# Patient Record
Sex: FEMALE | Race: WHITE | NOT HISPANIC OR LATINO | Employment: FULL TIME | URBAN - NONMETROPOLITAN AREA
[De-identification: names, ages, dates, MRNs, and addresses within clinical notes are randomized per-mention and may not be internally consistent; named-entity substitution may affect disease eponyms.]

---

## 2023-07-17 DIAGNOSIS — E89.0 HYPOTHYROIDISM, POSTSURGICAL: ICD-10-CM

## 2023-07-17 DIAGNOSIS — F32.A ANXIETY AND DEPRESSION: ICD-10-CM

## 2023-07-17 DIAGNOSIS — J45.909 MILD ASTHMA, UNSPECIFIED WHETHER COMPLICATED, UNSPECIFIED WHETHER PERSISTENT (HHS-HCC): ICD-10-CM

## 2023-07-17 DIAGNOSIS — F41.9 ANXIETY AND DEPRESSION: ICD-10-CM

## 2023-07-17 PROBLEM — E78.5 HYPERLIPIDEMIA: Status: ACTIVE | Noted: 2023-07-17

## 2023-07-17 RX ORDER — MONTELUKAST SODIUM 10 MG/1
10 TABLET ORAL DAILY
COMMUNITY
End: 2023-07-17 | Stop reason: SDUPTHER

## 2023-07-17 RX ORDER — FLUOXETINE HYDROCHLORIDE 20 MG/1
40 CAPSULE ORAL DAILY
COMMUNITY
End: 2023-07-17 | Stop reason: SDUPTHER

## 2023-07-17 RX ORDER — ALBUTEROL SULFATE 90 UG/1
2 AEROSOL, METERED RESPIRATORY (INHALATION)
COMMUNITY
Start: 2013-01-04 | End: 2023-09-19 | Stop reason: WASHOUT

## 2023-07-17 RX ORDER — FLUOXETINE HYDROCHLORIDE 20 MG/1
40 CAPSULE ORAL DAILY
Qty: 180 CAPSULE | Refills: 0 | Status: SHIPPED | OUTPATIENT
Start: 2023-07-17 | End: 2024-04-04 | Stop reason: SDUPTHER

## 2023-07-17 RX ORDER — LEVOTHYROXINE SODIUM 125 UG/1
125 TABLET ORAL DAILY
COMMUNITY
End: 2023-07-17 | Stop reason: SDUPTHER

## 2023-07-17 RX ORDER — MONTELUKAST SODIUM 10 MG/1
10 TABLET ORAL DAILY
Qty: 90 TABLET | Refills: 0 | Status: SHIPPED | OUTPATIENT
Start: 2023-07-17 | End: 2023-12-06 | Stop reason: SDUPTHER

## 2023-07-17 RX ORDER — CALCITRIOL 0.25 UG/1
0.25 CAPSULE ORAL
COMMUNITY
End: 2024-02-19 | Stop reason: SDUPTHER

## 2023-07-17 RX ORDER — LEVOTHYROXINE SODIUM 125 UG/1
125 TABLET ORAL DAILY
Qty: 90 TABLET | Refills: 0 | Status: SHIPPED | OUTPATIENT
Start: 2023-07-17 | End: 2024-01-26 | Stop reason: SDUPTHER

## 2023-07-17 RX ORDER — ALBUTEROL SULFATE 90 UG/1
AEROSOL, METERED RESPIRATORY (INHALATION)
Qty: 25.5 G | Refills: 3 | Status: SHIPPED | OUTPATIENT
Start: 2023-07-17 | End: 2023-09-19 | Stop reason: SDUPTHER

## 2023-09-19 ENCOUNTER — APPOINTMENT (OUTPATIENT)
Dept: PRIMARY CARE | Facility: CLINIC | Age: 58
End: 2023-09-19
Payer: COMMERCIAL

## 2023-09-19 ENCOUNTER — OFFICE VISIT (OUTPATIENT)
Dept: PRIMARY CARE | Facility: CLINIC | Age: 58
End: 2023-09-19
Payer: COMMERCIAL

## 2023-09-19 VITALS
DIASTOLIC BLOOD PRESSURE: 78 MMHG | BODY MASS INDEX: 38.05 KG/M2 | TEMPERATURE: 97.8 F | WEIGHT: 223.4 LBS | OXYGEN SATURATION: 98 % | SYSTOLIC BLOOD PRESSURE: 138 MMHG | HEART RATE: 84 BPM

## 2023-09-19 DIAGNOSIS — R05.9 COUGH, UNSPECIFIED TYPE: Primary | ICD-10-CM

## 2023-09-19 DIAGNOSIS — J45.909 MILD ASTHMA, UNSPECIFIED WHETHER COMPLICATED, UNSPECIFIED WHETHER PERSISTENT (HHS-HCC): ICD-10-CM

## 2023-09-19 DIAGNOSIS — J01.80 OTHER ACUTE SINUSITIS, RECURRENCE NOT SPECIFIED: ICD-10-CM

## 2023-09-19 PROBLEM — I25.10 MILD CAD: Status: ACTIVE | Noted: 2023-09-19

## 2023-09-19 PROBLEM — K44.9 HIATAL HERNIA: Status: ACTIVE | Noted: 2023-09-19

## 2023-09-19 PROBLEM — R91.1 LUNG NODULE: Status: ACTIVE | Noted: 2023-09-19

## 2023-09-19 PROBLEM — K76.0 FATTY LIVER: Status: ACTIVE | Noted: 2023-09-19

## 2023-09-19 PROBLEM — L73.2 HIDRADENITIS SUPPURATIVA: Status: ACTIVE | Noted: 2021-07-06

## 2023-09-19 PROBLEM — R73.09 ELEVATED GLUCOSE: Status: ACTIVE | Noted: 2023-09-19

## 2023-09-19 PROBLEM — L91.8 OTHER HYPERTROPHIC DISORDERS OF THE SKIN: Status: ACTIVE | Noted: 2021-07-06

## 2023-09-19 PROBLEM — M67.919 ROTATOR CUFF DISORDER: Status: ACTIVE | Noted: 2023-09-19

## 2023-09-19 PROBLEM — E04.0 SIMPLE GOITER: Status: ACTIVE | Noted: 2023-09-19

## 2023-09-19 PROBLEM — Z85.850 HISTORY OF THYROID CANCER: Status: ACTIVE | Noted: 2023-09-19

## 2023-09-19 PROBLEM — G47.30 SLEEP APNEA: Status: ACTIVE | Noted: 2023-09-19

## 2023-09-19 PROBLEM — I70.0 AORTIC ATHEROSCLEROSIS (CMS-HCC): Status: ACTIVE | Noted: 2023-09-19

## 2023-09-19 PROBLEM — E66.9 OBESITY (BMI 30-39.9): Status: ACTIVE | Noted: 2023-09-19

## 2023-09-19 PROBLEM — E89.2 HYPOPARATHYROIDISM AFTER PROCEDURE (MULTI): Status: ACTIVE | Noted: 2023-09-19

## 2023-09-19 PROBLEM — E83.51 HYPOCALCEMIA: Status: ACTIVE | Noted: 2023-09-19

## 2023-09-19 PROBLEM — E89.2: Status: ACTIVE | Noted: 2023-09-19

## 2023-09-19 PROCEDURE — 99214 OFFICE O/P EST MOD 30 MIN: CPT | Performed by: FAMILY MEDICINE

## 2023-09-19 PROCEDURE — 1036F TOBACCO NON-USER: CPT | Performed by: FAMILY MEDICINE

## 2023-09-19 RX ORDER — ALBUTEROL SULFATE 90 UG/1
AEROSOL, METERED RESPIRATORY (INHALATION)
Qty: 25.5 G | Refills: 3 | Status: SHIPPED | OUTPATIENT
Start: 2023-09-19 | End: 2023-12-06 | Stop reason: SDUPTHER

## 2023-09-19 RX ORDER — AMOXICILLIN 500 MG/1
1000 CAPSULE ORAL 2 TIMES DAILY
Qty: 40 CAPSULE | Refills: 0 | Status: CANCELLED | OUTPATIENT
Start: 2023-09-19 | End: 2023-09-29

## 2023-09-19 RX ORDER — MOMETASONE FUROATE AND FORMOTEROL FUMARATE DIHYDRATE 200; 5 UG/1; UG/1
2 AEROSOL RESPIRATORY (INHALATION) 2 TIMES DAILY
COMMUNITY
Start: 2018-03-07 | End: 2023-09-19 | Stop reason: SDUPTHER

## 2023-09-19 RX ORDER — AZITHROMYCIN 250 MG/1
TABLET, FILM COATED ORAL
Qty: 6 TABLET | Refills: 0 | Status: SHIPPED | OUTPATIENT
Start: 2023-09-19 | End: 2023-10-11 | Stop reason: WASHOUT

## 2023-09-19 RX ORDER — MOMETASONE FUROATE AND FORMOTEROL FUMARATE DIHYDRATE 200; 5 UG/1; UG/1
2 AEROSOL RESPIRATORY (INHALATION) 2 TIMES DAILY
Qty: 30 G | Refills: 3 | Status: SHIPPED | OUTPATIENT
Start: 2023-09-19 | End: 2024-04-23

## 2023-09-19 NOTE — PROGRESS NOTES
Subjective      HPI:    Arlette Cedeño. Is 58 y.o.. female. Here for acute visit-     PCP is - Dr Holland pt         Chief Complaint   Patient presents with    URI     Worsening sx         What concern/ problem/pain/symptom  brings you here today?  Cold sx are worsening      how long has pt had sxs?  X1 week      describe symptoms-    fever- no  nausea- no  vomiting- no  SOB- no  CP- no      how often do symptoms occur? Off and on      has pt tried anything for current symptoms, including medications (OTC or prescription)  ?  no      what makes symptoms worse?nothing  specific      has pt been seen recently for this problem ( within past 2-3 weeks) ? no    if yes- where?    by who?    what treatment was provided?      Social History     Tobacco Use   Smoking Status Never   Smokeless Tobacco Never        reports current alcohol use.       Objective            Vitals:    09/19/23 1459   BP: 138/78   BP Location: Left arm   Patient Position: Sitting   BP Cuff Size: Adult long   Pulse: 84   Temp: 36.6 °C (97.8 °F)   TempSrc: Temporal   SpO2: 98%   Weight: 101 kg (223 lb 6.4 oz)           PHYSICAL:      Pt is A and O x3, NAD, Vital signs are within normal limits  General Appearance- normal , good hygiene,talks easily  EYES- conjunctiva and lids- normal  EARS/NOSE-TM's normal, head normocephalic and atraumatic, nasopharynx-green nasal discharge, no trismus, no hot potato voice  OROPHARYNX- no exudate  NECK- supple, FROM  LYMPH- no cervical lymph nodes palpated   CV- RRR without murmur  PULM- CTA bilaterally  Respiratory effort- normal respiratory effort , no retractions or nasal flaring   ABDOMEN- normoactive BS's   EXTREMITIES-no edema,NT  SKIN- no abnormal skin lesions on inspection or palpation   NEURO- no focal deficits  PSYCH- pleasant, normal judgement and insight      BP Readings from Last 3 Encounters:   09/19/23 138/78   01/31/23 131/69   12/07/22 129/80         Wt Readings from Last 3 Encounters:    09/19/23 101 kg (223 lb 6.4 oz)   01/31/23 90.8 kg (200 lb 4 oz)   12/07/22 86 kg (189 lb 9.6 oz)       BMI Readings from Last 3 Encounters:   09/19/23 38.05 kg/m²   01/31/23 34.11 kg/m²   12/07/22 32.29 kg/m²           The number and complexity of problems addressed is considered moderate.  The amount and/or complexity of data reviewed and analyzed is considered moderate. The risk of complications and/or morbidity/mortality of patient is considered moderate. Overall, this patient encounter is considered a moderate risk visit.      What are antibiotics?  Antibiotics are medicines that help people fight infections caused by bacteria. They work by killing bacteria that are in the body. These medicines come in many different forms, including pills, ointments, and liquids that are given by injection.    Antibiotics can do a lot of good. For people with serious bacterial infections, antibiotics can save lives. But people use them far too often, even when they're not needed. This is causing a very serious problem called antibiotic resistance. Antibiotic resistance happens when bacteria that have been exposed to an antibiotic change so that the antibiotic can no longer kill them. In those bacteria, the antibiotic has no effect. Because of this problem, doctors are having a harder and harder time treating infections. Experts worry that there will soon be infections that don't respond to any antibiotics.    When are antibiotics helpful?  Antibiotics can help people fight off infections caused by bacteria. They do not work on infections caused by viruses.    Some common bacterial infections that are treated with antibiotics include:    Strep throat    Pneumonia (an infection of the lungs)    Bladder infections    Infections you catch through sex, such as gonorrhea and chlamydia    When are antibiotics NOT helpful?    Antibiotics do not work on infections caused by viruses.    Antibiotics are not helpful for the common cold,  because the common cold is caused by a virus.    Antibiotics are not helpful for the flu, because the flu is caused by a virus. (People with the flu can be treated with medicines called antiviral medicines, which are different from antibiotics.)      Even though antibiotics don't work on infections caused by viruses, people sometimes believe that they do. That's because they took antibiotics for a viral infection before and then got better. The problem is that those people would have gotten better with or without an antibiotic. When they get better with the antibiotic, they think that's what cured them, when in reality the antibiotic had nothing to do with it.    What's the harm in taking antibiotics even if they might not help?  There are many reasons you should not take antibiotics unless you absolutely need them:    Antibiotics cause side effects, such as nausea, vomiting, and diarrhea. They can even make it more likely that you will get a different kind of infection, such as yeast infection (if you are a woman).    Allergies to antibiotics are common. You can develop an allergy to an antibiotic, even if you have not had a problem with it before. Some allergies are just unpleasant, causing rashes and itching. But some can be very serious and even life-threatening. It is better to avoid any risk of an allergy, if the antibiotic wouldn't help you anyway.    Overuse of antibiotics leads to antibiotic resistance. Using antibiotics when they are not needed gives bacteria a chance to change, so that the antibiotics cannot hurt them later on. People who have infections caused by antibiotic-resistant bacteria often have to be treated in the hospital with many different antibiotics. People can even die from these infections, because there is no antibiotic that will cure them.    When should I take antibiotics?  You should take antibiotics only when a doctor or nurse prescribes them to you. You should never take  "antibiotics prescribed to someone else, and you should not take antibiotics that were prescribed to you for a previous illness. When prescribing an antibiotic, doctors and nurses have to carefully pick the right antibiotic for a particular infection. Not all antibiotics work on all bacteria.    If you do have an infection caused by a bacteria, your doctor or nurse might want to find out what that bacteria is, and which antibiotics can kill it. They do this by taking a \"culture\" of the bacteria and growing it in the lab. But it's not possible to do a culture on someone who has already started an antibiotic. So if you start an antibiotic without input from a doctor or nurse it will be impossible to know if you have taken the right one.    What can I do to reduce antibiotic resistance?  Here are some things that can help:    Do not pressure your doctor or nurse for antibiotics when they do not think you need them.    If you are prescribed antibiotics, finish all of the medicine and take it exactly as directed. Never skip doses or stop taking the medicine without talking to your doctor or nurse.    Do not give antibiotics that were prescribed to you to anyone else.    What if my doctor prescribes an antibiotic that did not work for me before?  If an antibiotic did not work for you before, that does not mean it will never work for you. If you have used an antibiotic before and it did not work, tell your doctor. But keep in mind that the infection you had before might not have been caused by the same bacteria that you have now. The \"best\" antibiotic is the right one for the bacteria causing the infection, not for the person with the infection.    What if I am allergic to an antibiotic?  If you had a bad reaction to an antibiotic, tell your doctor or nurse about it. But do not assume you are allergic unless your doctor or nurse tells you that you are.    Many people who think they are allergic to an antibiotic are wrong. " If you get nauseous after taking an antibiotic, that does not mean you are allergic to it. Nausea is a common side effect of many antibiotics. If you are a woman and you get a yeast infection after taking an antibiotic, that does not mean you are allergic to it. Yeast infections are a common side effect of antibiotics.    Symptoms of antibiotic allergy can be mild and include a flat rash and itching. They can also be more serious and include:    Hives - Hives are raised, red patches of skin that are usually very itchy (picture 1).    Lip or tongue swelling    Trouble swallowing or breathing    Serious allergy symptoms can start right after you start taking an antibiotic if you are very sensitive. Less serious symptoms, on the other hand, often start a day or more later.    Almost all antibiotics may cause possible side effects of allergic reaction, nausea, vomiting, diarrhea- most worrisome caused by C. diff, and secondary yeast infection.        Assessment/Plan        1. Cough, unspecified type  azithromycin (Zithromax Z-John) 250 mg tablet      2. Other acute sinusitis, recurrence not specified  azithromycin (Zithromax Z-John) 250 mg tablet            No orders of the defined types were placed in this encounter.                Call if no better or if symptoms worsen                                                                                                                                            Subjective      HPI:    Arlette Cedeño. Is 58 y.o.. female. Here for acute visit-     PCP is - Dr Holland pt         Chief Complaint   Patient presents with    URI     Worsening sx         What concern/ problem/pain/symptom  brings you here today?      how long has pt had sxs?      describe symptoms-    fever-  nausea-  vomiting-  SOB-  CP-      how often do symptoms occur?      has pt tried anything for current symptoms, including medications (OTC or prescription)  ?        what makes symptoms worse?      has  pt been seen recently for this problem ( within past 2-3 weeks) ?     if yes- where?    by who?    what treatment was provided?      Social History     Tobacco Use   Smoking Status Never   Smokeless Tobacco Never        reports current alcohol use.       Objective            Vitals:    09/19/23 1459   BP: 138/78   BP Location: Left arm   Patient Position: Sitting   BP Cuff Size: Adult long   Pulse: 84   Temp: 36.6 °C (97.8 °F)   TempSrc: Temporal   SpO2: 98%   Weight: 101 kg (223 lb 6.4 oz)           PHYSICAL:      CONSTITUTIONAL- NAD, Pt is A and O x3, Vital signs are within normal limits, well- hydrated, non-toxic   General Appearance- normal , good hygiene, talks easily  EYES- conjunctiva and lids- normal  EARS/NOSE-TM's normal, head normocephalic and atraumatic, nasopharynx-no nasal discharge, no trismus, no hot potato voice, oropharynx- normal  NECK- supple, FROM  LYMPH- no cervical lymph nodes palpated   CV- RRR without murmur  PULM- mild rhonchi, no wheezes bilaterally       Respiratory effort- normal respiratory effort , no retractions or nasal flaring   ABDOMEN- normoactive BS's , soft , NT, no hepatosplenomegaly palpated  EXTREMITIES- no edema or varicosities           SKIN- no abnormal skin lesions on inspection or palpation   NEURO- no focal deficits  PSYCH- pleasant, normal judgement and insight              BP Readings from Last 3 Encounters:   09/19/23 138/78   01/31/23 131/69   12/07/22 129/80         Wt Readings from Last 3 Encounters:   09/19/23 101 kg (223 lb 6.4 oz)   01/31/23 90.8 kg (200 lb 4 oz)   12/07/22 86 kg (189 lb 9.6 oz)       BMI Readings from Last 3 Encounters:   09/19/23 38.05 kg/m²   01/31/23 34.11 kg/m²   12/07/22 32.29 kg/m²           The number and complexity of problems addressed is considered moderate.  The amount and/or complexity of data reviewed and analyzed is considered moderate. The risk of complications and/or morbidity/mortality of patient is considered moderate.  Overall, this patient encounter is considered a moderate risk visit.      What are antibiotics?  Antibiotics are medicines that help people fight infections caused by bacteria. They work by killing bacteria that are in the body. These medicines come in many different forms, including pills, ointments, and liquids that are given by injection.    Antibiotics can do a lot of good. For people with serious bacterial infections, antibiotics can save lives. But people use them far too often, even when they're not needed. This is causing a very serious problem called antibiotic resistance. Antibiotic resistance happens when bacteria that have been exposed to an antibiotic change so that the antibiotic can no longer kill them. In those bacteria, the antibiotic has no effect. Because of this problem, doctors are having a harder and harder time treating infections. Experts worry that there will soon be infections that don't respond to any antibiotics.    When are antibiotics helpful?  Antibiotics can help people fight off infections caused by bacteria. They do not work on infections caused by viruses.    Some common bacterial infections that are treated with antibiotics include:    Strep throat    Pneumonia (an infection of the lungs)    Bladder infections    Infections you catch through sex, such as gonorrhea and chlamydia    When are antibiotics NOT helpful?    Antibiotics do not work on infections caused by viruses.    Antibiotics are not helpful for the common cold, because the common cold is caused by a virus.    Antibiotics are not helpful for the flu, because the flu is caused by a virus. (People with the flu can be treated with medicines called antiviral medicines, which are different from antibiotics.)      Even though antibiotics don't work on infections caused by viruses, people sometimes believe that they do. That's because they took antibiotics for a viral infection before and then got better. The problem is that  those people would have gotten better with or without an antibiotic. When they get better with the antibiotic, they think that's what cured them, when in reality the antibiotic had nothing to do with it.    What's the harm in taking antibiotics even if they might not help?  There are many reasons you should not take antibiotics unless you absolutely need them:    Antibiotics cause side effects, such as nausea, vomiting, and diarrhea. They can even make it more likely that you will get a different kind of infection, such as yeast infection (if you are a woman).    Allergies to antibiotics are common. You can develop an allergy to an antibiotic, even if you have not had a problem with it before. Some allergies are just unpleasant, causing rashes and itching. But some can be very serious and even life-threatening. It is better to avoid any risk of an allergy, if the antibiotic wouldn't help you anyway.    Overuse of antibiotics leads to antibiotic resistance. Using antibiotics when they are not needed gives bacteria a chance to change, so that the antibiotics cannot hurt them later on. People who have infections caused by antibiotic-resistant bacteria often have to be treated in the hospital with many different antibiotics. People can even die from these infections, because there is no antibiotic that will cure them.    When should I take antibiotics?  You should take antibiotics only when a doctor or nurse prescribes them to you. You should never take antibiotics prescribed to someone else, and you should not take antibiotics that were prescribed to you for a previous illness. When prescribing an antibiotic, doctors and nurses have to carefully pick the right antibiotic for a particular infection. Not all antibiotics work on all bacteria.    If you do have an infection caused by a bacteria, your doctor or nurse might want to find out what that bacteria is, and which antibiotics can kill it. They do this by taking a  "\"culture\" of the bacteria and growing it in the lab. But it's not possible to do a culture on someone who has already started an antibiotic. So if you start an antibiotic without input from a doctor or nurse it will be impossible to know if you have taken the right one.    What can I do to reduce antibiotic resistance?  Here are some things that can help:    Do not pressure your doctor or nurse for antibiotics when they do not think you need them.    If you are prescribed antibiotics, finish all of the medicine and take it exactly as directed. Never skip doses or stop taking the medicine without talking to your doctor or nurse.    Do not give antibiotics that were prescribed to you to anyone else.    What if my doctor prescribes an antibiotic that did not work for me before?  If an antibiotic did not work for you before, that does not mean it will never work for you. If you have used an antibiotic before and it did not work, tell your doctor. But keep in mind that the infection you had before might not have been caused by the same bacteria that you have now. The \"best\" antibiotic is the right one for the bacteria causing the infection, not for the person with the infection.    What if I am allergic to an antibiotic?  If you had a bad reaction to an antibiotic, tell your doctor or nurse about it. But do not assume you are allergic unless your doctor or nurse tells you that you are.    Many people who think they are allergic to an antibiotic are wrong. If you get nauseous after taking an antibiotic, that does not mean you are allergic to it. Nausea is a common side effect of many antibiotics. If you are a woman and you get a yeast infection after taking an antibiotic, that does not mean you are allergic to it. Yeast infections are a common side effect of antibiotics.    Symptoms of antibiotic allergy can be mild and include a flat rash and itching. They can also be more serious and include:    Hives - Hives are " raised, red patches of skin that are usually very itchy (picture 1).    Lip or tongue swelling    Trouble swallowing or breathing    Serious allergy symptoms can start right after you start taking an antibiotic if you are very sensitive. Less serious symptoms, on the other hand, often start a day or more later.    Almost all antibiotics may cause possible side effects of allergic reaction, nausea, vomiting, diarrhea- most worrisome caused by C. diff, and secondary yeast infection.        Assessment/Plan        1. Cough, unspecified type  azithromycin (Zithromax Z-John) 250 mg tablet      2. Other acute sinusitis, recurrence not specified  azithromycin (Zithromax Z-John) 250 mg tablet                  Start zithromax        Call if no better or if symptoms worsen

## 2023-10-10 RX ORDER — BENZONATATE 100 MG/1
100 CAPSULE ORAL 3 TIMES DAILY PRN
COMMUNITY
End: 2023-10-11 | Stop reason: WASHOUT

## 2023-10-10 RX ORDER — TRIAMCINOLONE ACETONIDE 1 MG/G
1 CREAM TOPICAL 2 TIMES DAILY
COMMUNITY
End: 2024-04-23 | Stop reason: SDUPTHER

## 2023-10-10 RX ORDER — ERGOCALCIFEROL 1.25 MG/1
1.25 CAPSULE ORAL
COMMUNITY
End: 2023-12-06 | Stop reason: SDUPTHER

## 2023-10-10 RX ORDER — CLINDAMYCIN PHOSPHATE 10 UG/ML
LOTION TOPICAL
COMMUNITY
Start: 2021-07-06 | End: 2024-04-23 | Stop reason: SDUPTHER

## 2023-10-10 RX ORDER — CALCIUM ACETATE 667 MG/1
2001 TABLET ORAL DAILY
COMMUNITY
End: 2024-04-23 | Stop reason: WASHOUT

## 2023-10-10 RX ORDER — BENZOYL PEROXIDE 50 MG/ML
1 LIQUID TOPICAL
COMMUNITY
Start: 2021-07-06

## 2023-10-11 ENCOUNTER — OFFICE VISIT (OUTPATIENT)
Dept: PRIMARY CARE | Facility: CLINIC | Age: 58
End: 2023-10-11
Payer: COMMERCIAL

## 2023-10-11 VITALS
OXYGEN SATURATION: 97 % | RESPIRATION RATE: 14 BRPM | SYSTOLIC BLOOD PRESSURE: 118 MMHG | HEIGHT: 64 IN | HEART RATE: 81 BPM | WEIGHT: 226 LBS | DIASTOLIC BLOOD PRESSURE: 68 MMHG | TEMPERATURE: 98.7 F | BODY MASS INDEX: 38.58 KG/M2

## 2023-10-11 DIAGNOSIS — E89.0 HYPOTHYROIDISM, POSTSURGICAL: ICD-10-CM

## 2023-10-11 DIAGNOSIS — F41.9 ANXIETY AND DEPRESSION: ICD-10-CM

## 2023-10-11 DIAGNOSIS — Z12.11 COLON CANCER SCREENING: ICD-10-CM

## 2023-10-11 DIAGNOSIS — Z85.850 HISTORY OF THYROID CANCER: ICD-10-CM

## 2023-10-11 DIAGNOSIS — E66.9 CLASS 2 OBESITY WITH BODY MASS INDEX (BMI) OF 38.0 TO 38.9 IN ADULT, UNSPECIFIED OBESITY TYPE, UNSPECIFIED WHETHER SERIOUS COMORBIDITY PRESENT: ICD-10-CM

## 2023-10-11 DIAGNOSIS — F32.A ANXIETY AND DEPRESSION: ICD-10-CM

## 2023-10-11 DIAGNOSIS — Z00.00 HEALTHCARE MAINTENANCE: Primary | ICD-10-CM

## 2023-10-11 DIAGNOSIS — E89.2 HYPOPARATHYROIDISM AFTER PROCEDURE (MULTI): ICD-10-CM

## 2023-10-11 DIAGNOSIS — Z12.31 ENCOUNTER FOR SCREENING MAMMOGRAM FOR MALIGNANT NEOPLASM OF BREAST: ICD-10-CM

## 2023-10-11 DIAGNOSIS — I70.0 AORTIC ATHEROSCLEROSIS (CMS-HCC): ICD-10-CM

## 2023-10-11 DIAGNOSIS — E83.51 HYPOCALCEMIA: ICD-10-CM

## 2023-10-11 DIAGNOSIS — R73.09 ELEVATED GLUCOSE: ICD-10-CM

## 2023-10-11 DIAGNOSIS — R25.1 TREMOR: ICD-10-CM

## 2023-10-11 DIAGNOSIS — E89.2 S/P TOTAL PARATHYROIDECTOMY (CMS/HCC): ICD-10-CM

## 2023-10-11 DIAGNOSIS — R91.1 LUNG NODULE: ICD-10-CM

## 2023-10-11 DIAGNOSIS — I25.10 MILD CAD: ICD-10-CM

## 2023-10-11 DIAGNOSIS — Z12.4 SCREENING FOR CERVICAL CANCER: ICD-10-CM

## 2023-10-11 DIAGNOSIS — E78.5 HYPERLIPIDEMIA, UNSPECIFIED HYPERLIPIDEMIA TYPE: ICD-10-CM

## 2023-10-11 PROBLEM — E04.0 SIMPLE GOITER: Status: RESOLVED | Noted: 2023-09-19 | Resolved: 2023-10-11

## 2023-10-11 PROCEDURE — 1036F TOBACCO NON-USER: CPT | Performed by: FAMILY MEDICINE

## 2023-10-11 PROCEDURE — 99396 PREV VISIT EST AGE 40-64: CPT | Performed by: FAMILY MEDICINE

## 2023-10-11 PROCEDURE — 3008F BODY MASS INDEX DOCD: CPT | Performed by: FAMILY MEDICINE

## 2023-10-11 ASSESSMENT — ANXIETY QUESTIONNAIRES
IF YOU CHECKED OFF ANY PROBLEMS ON THIS QUESTIONNAIRE, HOW DIFFICULT HAVE THESE PROBLEMS MADE IT FOR YOU TO DO YOUR WORK, TAKE CARE OF THINGS AT HOME, OR GET ALONG WITH OTHER PEOPLE: SOMEWHAT DIFFICULT
GAD7 TOTAL SCORE: 6
7. FEELING AFRAID AS IF SOMETHING AWFUL MIGHT HAPPEN: SEVERAL DAYS
3. WORRYING TOO MUCH ABOUT DIFFERENT THINGS: MORE THAN HALF THE DAYS
6. BECOMING EASILY ANNOYED OR IRRITABLE: SEVERAL DAYS
1. FEELING NERVOUS, ANXIOUS, OR ON EDGE: NOT AT ALL
5. BEING SO RESTLESS THAT IT IS HARD TO SIT STILL: NOT AT ALL
2. NOT BEING ABLE TO STOP OR CONTROL WORRYING: MORE THAN HALF THE DAYS
4. TROUBLE RELAXING: NOT AT ALL

## 2023-10-11 ASSESSMENT — PATIENT HEALTH QUESTIONNAIRE - PHQ9
2. FEELING DOWN, DEPRESSED OR HOPELESS: MORE THAN HALF THE DAYS
9. THOUGHTS THAT YOU WOULD BE BETTER OFF DEAD, OR OF HURTING YOURSELF: MORE THAN HALF THE DAYS
6. FEELING BAD ABOUT YOURSELF - OR THAT YOU ARE A FAILURE OR HAVE LET YOURSELF OR YOUR FAMILY DOWN: NEARLY EVERY DAY
4. FEELING TIRED OR HAVING LITTLE ENERGY: MORE THAN HALF THE DAYS
1. LITTLE INTEREST OR PLEASURE IN DOING THINGS: MORE THAN HALF THE DAYS
8. MOVING OR SPEAKING SO SLOWLY THAT OTHER PEOPLE COULD HAVE NOTICED. OR THE OPPOSITE, BEING SO FIGETY OR RESTLESS THAT YOU HAVE BEEN MOVING AROUND A LOT MORE THAN USUAL: SEVERAL DAYS
SUM OF ALL RESPONSES TO PHQ QUESTIONS 1-9: 17
10. IF YOU CHECKED OFF ANY PROBLEMS, HOW DIFFICULT HAVE THESE PROBLEMS MADE IT FOR YOU TO DO YOUR WORK, TAKE CARE OF THINGS AT HOME, OR GET ALONG WITH OTHER PEOPLE: SOMEWHAT DIFFICULT
5. POOR APPETITE OR OVEREATING: MORE THAN HALF THE DAYS
SUM OF ALL RESPONSES TO PHQ9 QUESTIONS 1 AND 2: 4
3. TROUBLE FALLING OR STAYING ASLEEP OR SLEEPING TOO MUCH: MORE THAN HALF THE DAYS
7. TROUBLE CONCENTRATING ON THINGS, SUCH AS READING THE NEWSPAPER OR WATCHING TELEVISION: SEVERAL DAYS

## 2023-10-11 NOTE — ASSESSMENT & PLAN NOTE
s/p surgery to remove parathyroids and thyroid in 2013 2/2 thyroid carcinoma   Patient due to find new endocrinologist, referral placed today.  Routine labs ordered today.  Continue current medications as prescribed.

## 2023-10-11 NOTE — ASSESSMENT & PLAN NOTE
Mood suboptimally controlled on Prozac 20 mg daily, since tolerating well will increase to Prozac 40 mg daily.  She notes some concern for possible ADD, patient to make follow-up visit to further discuss this.

## 2023-10-11 NOTE — ASSESSMENT & PLAN NOTE
Post-surgical hypothyroidism due to thyroid carcinoma diagnosed in 2013  S/p surgery to remove parathyroids and thyroid.   Patient presently on Levothyroxine, suppressive dose.     Patient due to find new endocrinologist, referral placed today.  Routine labs ordered today.  Continue current medications as prescribed.

## 2023-10-11 NOTE — ASSESSMENT & PLAN NOTE
A1c low prediabetic range in past, lifestyle managed  Unable to tolerate Metformin secondary to GI upset  4/2021 Hgb A1c 5.8, repeat labs ordered today including A1c.  Diet and exercise recommendations revisited, see wellness A/P.

## 2023-10-11 NOTE — PROGRESS NOTES
Subjective   Patient ID: Arlette Cedeño is a 58 y.o. female who presents for Annual Exam.    HPI     Patient presents today for annual physical.  Patient is doing well overall, they have no new concerns or issues.     Patient states she has not found a new endocrinologist yet.  She states she has not scheduled for her colonoscopy either.    She notes tremors, worse in AM, bilaterally.  Very fine and mild, states more noticeable when she first wakes.  Mother passed from advanced parkinson's.    WELLNESS VISIT   TDAP: none found  SHINGRIX: none found  PNEUMOVAX: N/A  PAP: referral to GYN 4/2021  MAMMO: 4/2021 R axilla asymmetry(corresponds to HS on US)(sister w/ h/o breast CA)  CSCOPE: none found  DEXA: N/A  HEP C SCREEN: none found  CACS: 89 (low) in 8/2021 (aortic atherosclerosis + fatty liver + hiatal hernia)  LIPID: 4/2021 TC/HDL ratio 5.1 ()    Dental visits up to date.  Vision screening up to date.    Patient declines influenza vaccine.  Patient to inquire with insurance regarding Shingrix vaccine.  Patient declines TDAP vaccine.    Breast cancer screening: DUE  Reports family history in first degree relative - sister.  Denies lumps/bumps, skin changes, nipple retraction, or nipple drainage.    Cervical cancer screening: DUE  Denies family history in first degree relative.  Denies pelvic pain, vaginal discharge, or vaginal bleeding.    Colon cancer screening: DUE  Denies family history in first degree relative.  Reports possible dairy/lactose intolerance.  Denies melena, hematochezia, constipation, diarrhea, bloating, change in bowel habits.    ROUTINE VISIT  CHRONIC CONDITIONS:   -Mood  Taking Prozac 20 mg daily.    Notes some depression, only taking the 20 mg daily, was told she could increase to the 40 mg if needed but never did.     -HLD + mild CAD  Not currently on statin or ASA therapy.   Recommended statin therapy 8/2021 OV, patient deferred and opted to work on lifestyle measures.  8/2021 CACS  was 89.   4/2021 TC/HDL ratio 5.1 ()    Patient denies any facial droop, sudden vision loss, weakness or numbness on one side of body.   Patient denies chest pain, shortness of breath with exertion, palpitations, or symptoms of claudication.     -Hyperglycemia  A1c low prediabetic range, lifestyle managed  Unable to tolerate Metformin secondary to GI upset  4/2021 Hgb A1c 5.8.  11/2021 fasting glucose 103     -Hypothyroidism, post-surgical  Thyroid carcinoma diagnosed in 2013  S/p surgery to remove parathyroids and thyroid.   Patient presently on Levothyroxine, suppressive dose.   4/2021 TSH 0.06, T4 normal, T3 normal. Antithyroglobulin antibody negative.    -Hypoparathyroidism  s/p surgery to remove parathyroids and thyroid in 2013 2/2 thyroid carcinoma  Taking Calcitriol 0.25 mcg 4 tablets daily + Calcium Acetate 667 mg 2 tablets daily.     -Hypocalcemia, on Calcium Acetate 667 mg, 3 tablets daily.   11/2021 Calcium low at 8.3 (low normal 8.6. Ionized calcium low (1.05, normal 1.10).  4/2021 Calcium low at 8.1 (low normal 8.6). Ionized calcium low (1.00, normal 1.10).     -Vitamin D deficiency  Recently improved to sufficient range with prescription vitamin D (8/2021)  11/2021 Vit D level normal (39)  4/2021 Vit D level low (22).     -Lung nodule  3-4 mm lateral right, incidental finding on 8/2021 CACS.   Because this is less than 6 mm no follow-up necessary per Fleischner Criteria.   Patient non-smoker but would like to have repeat CT done in 6 months due to family history (sister with hx of breast cancer spread to lymph nodes) and personal history of thyroid carcinoma.     11/2021 CT Chest showed stable LLL nodule but new RLL nodule not in field of view on prior exam.   Radiology recommended considering 2 year follow-up from initial CT cardiac scoring exam to demonstrate stability.     Repeat due 2023.    Review of Systems   All other systems reviewed and are negative.      Objective   /68 (BP  "Location: Right arm, Patient Position: Sitting, BP Cuff Size: Adult)   Pulse 81   Temp 37.1 °C (98.7 °F)   Resp 14   Ht 1.626 m (5' 4\")   Wt 103 kg (226 lb)   SpO2 97%   BMI 38.79 kg/m²     Physical Exam  Vitals and nursing note reviewed.   Constitutional:       General: She is not in acute distress.     Appearance: Normal appearance. She is not toxic-appearing.   HENT:      Head: Normocephalic and atraumatic.   Eyes:      Extraocular Movements: Extraocular movements intact.      Pupils: Pupils are equal, round, and reactive to light.   Neck:      Thyroid: No thyromegaly.      Vascular: No hepatojugular reflux or JVD.   Cardiovascular:      Rate and Rhythm: Normal rate and regular rhythm.      Heart sounds: No murmur heard.     No friction rub. No gallop.   Pulmonary:      Effort: Pulmonary effort is normal.      Breath sounds: Normal breath sounds. No wheezing, rhonchi or rales.   Abdominal:      General: Bowel sounds are normal. There is no distension.      Palpations: Abdomen is soft. There is no mass.      Tenderness: There is no abdominal tenderness. There is no guarding.   Musculoskeletal:      Right lower leg: No edema.      Left lower leg: No edema.   Lymphadenopathy:      Cervical: No cervical adenopathy.   Neurological:      General: No focal deficit present.      Mental Status: She is alert and oriented to person, place, and time.      Comments: Fine tremor with intention, bilaterally  No dysdiadochokinesis   Psychiatric:         Mood and Affect: Mood normal.         Behavior: Behavior normal.         Assessment/Plan   Problem List Items Addressed This Visit             ICD-10-CM    Anxiety and depression F41.9, F32.A     Mood suboptimally controlled on Prozac 20 mg daily, since tolerating well will increase to Prozac 40 mg daily.  She notes some concern for possible ADD, patient to make follow-up visit to further discuss this.         Hyperlipidemia E78.5     8/2021 CACS was 89.   4/2021 TC/HDL " ratio 5.1 ()  Goal TC/HDL ratio 3.4 or less, LDL 99 or less,  or less, and TRIG 150 or less.     Previously recommended statin but deferred, continues with lifestyle management at this time.  Repeat lipid panel ordered today.  Diet and exercise recommendations revisited.   Will further discuss at next routine visit.         Hypothyroidism, postsurgical E89.0     Post-surgical hypothyroidism due to thyroid carcinoma diagnosed in 2013  S/p surgery to remove parathyroids and thyroid.   Patient presently on Levothyroxine, suppressive dose.     Patient due to find new endocrinologist, referral placed today.  Routine labs ordered today.  Continue current medications as prescribed.         Relevant Orders    Triiodothyronine, Free    Thyroxine, Free    Thyroid Stimulating Hormone    Aortic atherosclerosis (CMS/HCC) I70.0     8/2021 CACS was 89.   4/2021 TC/HDL ratio 5.1 ()  Goal TC/HDL ratio 3.4 or less, LDL 99 or less,  or less, and TRIG 150 or less.     Previously recommended statin but deferred, continues with lifestyle management at this time.  Repeat lipid panel ordered today.  Diet and exercise recommendations revisited.   Will further discuss at next routine visit.         Elevated glucose R73.09     A1c low prediabetic range in past, lifestyle managed  Unable to tolerate Metformin secondary to GI upset  4/2021 Hgb A1c 5.8, repeat labs ordered today including A1c.  Diet and exercise recommendations revisited, see wellness A/P.         Relevant Orders    Hemoglobin A1C    History of thyroid cancer Z85.850    Relevant Orders    Triiodothyronine, Free    Thyroxine, Free    Thyroid Stimulating Hormone    Vitamin D 25-Hydroxy,Total (for eval of Vitamin D levels)    Phosphorus    Hypocalcemia E83.51     s/p surgery to remove parathyroids and thyroid in 2013 2/2 thyroid carcinoma   Patient due to find new endocrinologist, referral placed today.  Routine labs ordered today.  Continue current  medications as prescribed.         Hypoparathyroidism after procedure (CMS/[facility]) E89.2     s/p surgery to remove parathyroids and thyroid in 2013 2/2 thyroid carcinoma  Taking Calcitriol 0.25 mcg 4 tablets daily + Calcium Acetate 667 mg 2 tablets daily.    Patient due to find new endocrinologist, referral placed today.  Routine labs ordered today.  Continue current medications as prescribed.         Relevant Orders    Triiodothyronine, Free    Thyroxine, Free    Thyroid Stimulating Hormone    Vitamin D 25-Hydroxy,Total (for eval of Vitamin D levels)    Phosphorus    Lung nodule R91.1     3-4 mm lateral right, incidental finding on 8/2021 CACS.   Because this is less than 6 mm no follow-up necessary per Fleischner Criteria.   Patient non-smoker but would like to have repeat CT done in 6 months due to family history (sister with hx of breast cancer spread to lymph nodes) and personal history of thyroid carcinoma.     11/2021 CT Chest showed stable LLL nodule but new RLL nodule not in field of view on prior exam.   Radiology recommended considering 2 year follow-up from initial CT cardiac scoring exam to demonstrate stability.     Repeat due 2023. - CT chest w/o IV contrast ordered today.         Relevant Orders    CT chest wo IV contrast    Mild CAD I25.10     8/2021 CACS was 89.   4/2021 TC/HDL ratio 5.1 ()  Goal TC/HDL ratio 3.4 or less, LDL 99 or less,  or less, and TRIG 150 or less.     Previously recommended statin but deferred, continues with lifestyle management at this time.  Repeat lipid panel ordered today.  Diet and exercise recommendations revisited.   Will further discuss at next routine visit.         S/P total parathyroidectomy (CMS/[facility]) E89.2     s/p surgery to remove parathyroids and thyroid in 2013 2/2 thyroid carcinoma    Patient due to find new endocrinologist, referral placed today.  Routine labs ordered today.  Continue current medications as prescribed.         Healthcare maintenance  - Primary Z00.00     Vaccines and screenings reviewed.  Questionnaires completed.  Health and wellness topics reviewed.  Diet and exercise recommendations revisited.  Routine blood work ordered today.     Screening mammogram ordered today.  GI referral placed for colon cancer screening.  GYN referral placed for pap for cervical cancer screening.    Flu vaccine declined today, can get at pharmacy.  TDAP due, can get at pharmacy.    Shingles vaccine (Shingrix) is recommended. Please call insurance to see if covered. This vaccine is expensive but extremely effective. This is to be administered in 2 separate doses, 2- 6 months apart. This is a killed virus vaccine, so no risk of chicken pox or shingles with administration. The vaccine is approximately 90 % effective to protect against shingles even 5 years out. Side effects of the vaccine can include soreness of the arm at administration site and possible flu-like symptoms after administration. This is a strongly recommended vaccine.     LIFESTYLE MEASURES  -make sure you are avoiding refined carbs such as breads, pasta, cereal, candy, soda,  nutrition bars, granola, chips, and sugar sweetened beverages.      -eat 5- 7 servings daily of veggies,  healthy protein such as chicken, fish,  beans, and eggs, and include healthy fats in your diet such as seeds, nuts, olive oil, avocados, and salmon.   -exercise 4 - 6 days per week as you are able, 150 minutes total weekly divided up is recommended.  -Vitamin D is recommended at 1000 - 5000 IU international units daily.   -Always wear sunscreen when you have sun exposure.  -64 oz of water is recommended daily.  -Dental visits recommended every 6 months.  -Eye exam recommended every 2 years, for those with vision problems every year.           Relevant Orders    CBC    Comprehensive Metabolic Panel    Lipid Panel    Tremor R25.1     +FMHx of mom with parkinson's    Exam today with fine tremor with intention, bilaterally  No  dysdiadochokinesis.    Discussed benign essential tremor vs med side effect vs other.  Patient reassured that exam was not suggestive of Parkinson's.    Labs ordered today including TFTs and B-12.  Can further discuss at follow-up for lab review.          Other Visit Diagnoses         Codes    Class 2 obesity with body mass index (BMI) of 38.0 to 38.9 in adult, unspecified obesity type, unspecified whether serious comorbidity present     E66.9, Z68.38    Encounter for screening mammogram for malignant neoplasm of breast     Z12.31    Relevant Orders    BI mammo bilateral screening tomosynthesis    Colon cancer screening     Z12.11    Relevant Orders    Referral to Gastroenterology    Screening for cervical cancer     Z12.4    Relevant Orders    Referral to Obstetrics / Gynecology            Follow-up (can be virtual) in 4-6 weeks for recheck above/review labs.  Patient to make follow-up visit for evaluation of ADD if so inclined.  Call for sooner follow-up if needed.     Scribe Attestation  By signing my name below, ILeyla Scribe   attest that this documentation has been prepared under the direction and in the presence of Meghan Holland DO.

## 2023-10-11 NOTE — ASSESSMENT & PLAN NOTE
Vaccines and screenings reviewed.  Questionnaires completed.  Health and wellness topics reviewed.  Diet and exercise recommendations revisited.  Routine blood work ordered today.     Screening mammogram ordered today.  GI referral placed for colon cancer screening.  GYN referral placed for pap for cervical cancer screening.    Flu vaccine declined today, can get at pharmacy.  TDAP due, can get at pharmacy.    Shingles vaccine (Shingrix) is recommended. Please call insurance to see if covered. This vaccine is expensive but extremely effective. This is to be administered in 2 separate doses, 2- 6 months apart. This is a killed virus vaccine, so no risk of chicken pox or shingles with administration. The vaccine is approximately 90 % effective to protect against shingles even 5 years out. Side effects of the vaccine can include soreness of the arm at administration site and possible flu-like symptoms after administration. This is a strongly recommended vaccine.     LIFESTYLE MEASURES  -make sure you are avoiding refined carbs such as breads, pasta, cereal, candy, soda,  nutrition bars, granola, chips, and sugar sweetened beverages.      -eat 5- 7 servings daily of veggies,  healthy protein such as chicken, fish,  beans, and eggs, and include healthy fats in your diet such as seeds, nuts, olive oil, avocados, and salmon.   -exercise 4 - 6 days per week as you are able, 150 minutes total weekly divided up is recommended.  -Vitamin D is recommended at 1000 - 5000 IU international units daily.   -Always wear sunscreen when you have sun exposure.  -64 oz of water is recommended daily.  -Dental visits recommended every 6 months.  -Eye exam recommended every 2 years, for those with vision problems every year.

## 2023-10-11 NOTE — ASSESSMENT & PLAN NOTE
s/p surgery to remove parathyroids and thyroid in 2013 2/2 thyroid carcinoma  Taking Calcitriol 0.25 mcg 4 tablets daily + Calcium Acetate 667 mg 2 tablets daily.    Patient due to find new endocrinologist, referral placed today.  Routine labs ordered today.  Continue current medications as prescribed.

## 2023-10-11 NOTE — ASSESSMENT & PLAN NOTE
8/2021 CACS was 89.   4/2021 TC/HDL ratio 5.1 ()  Goal TC/HDL ratio 3.4 or less, LDL 99 or less,  or less, and TRIG 150 or less.     Previously recommended statin but deferred, continues with lifestyle management at this time.  Repeat lipid panel ordered today.  Diet and exercise recommendations revisited.   Will further discuss at next routine visit.

## 2023-10-11 NOTE — ASSESSMENT & PLAN NOTE
3-4 mm lateral right, incidental finding on 8/2021 CACS.   Because this is less than 6 mm no follow-up necessary per Fleischner Criteria.   Patient non-smoker but would like to have repeat CT done in 6 months due to family history (sister with hx of breast cancer spread to lymph nodes) and personal history of thyroid carcinoma.     11/2021 CT Chest showed stable LLL nodule but new RLL nodule not in field of view on prior exam.   Radiology recommended considering 2 year follow-up from initial CT cardiac scoring exam to demonstrate stability.     Repeat due 2023. - CT chest w/o IV contrast ordered today.

## 2023-10-11 NOTE — ASSESSMENT & PLAN NOTE
+FMHx of mom with parkinson's    Exam today with fine tremor with intention, bilaterally  No dysdiadochokinesis.    Discussed benign essential tremor vs med side effect vs other.  Patient reassured that exam was not suggestive of Parkinson's.    Labs ordered today including TFTs and B-12.  Can further discuss at follow-up for lab review.

## 2023-12-06 ENCOUNTER — TELEPHONE (OUTPATIENT)
Dept: PRIMARY CARE | Facility: CLINIC | Age: 58
End: 2023-12-06

## 2023-12-06 ENCOUNTER — TELEMEDICINE (OUTPATIENT)
Dept: PRIMARY CARE | Facility: CLINIC | Age: 58
End: 2023-12-06
Payer: COMMERCIAL

## 2023-12-06 DIAGNOSIS — F41.9 ANXIETY AND DEPRESSION: ICD-10-CM

## 2023-12-06 DIAGNOSIS — F32.A ANXIETY AND DEPRESSION: ICD-10-CM

## 2023-12-06 DIAGNOSIS — E55.9 VITAMIN D DEFICIENCY: Primary | ICD-10-CM

## 2023-12-06 DIAGNOSIS — R41.840 INATTENTION: ICD-10-CM

## 2023-12-06 DIAGNOSIS — J45.909 MILD ASTHMA, UNSPECIFIED WHETHER COMPLICATED, UNSPECIFIED WHETHER PERSISTENT (HHS-HCC): ICD-10-CM

## 2023-12-06 PROCEDURE — 99213 OFFICE O/P EST LOW 20 MIN: CPT | Performed by: FAMILY MEDICINE

## 2023-12-06 RX ORDER — ALBUTEROL SULFATE 90 UG/1
AEROSOL, METERED RESPIRATORY (INHALATION)
Qty: 25.5 G | Refills: 3 | Status: SHIPPED | OUTPATIENT
Start: 2023-12-06

## 2023-12-06 RX ORDER — BUPROPION HYDROCHLORIDE 150 MG/1
150 TABLET ORAL EVERY MORNING
Qty: 30 TABLET | Refills: 1 | Status: SHIPPED | OUTPATIENT
Start: 2023-12-06 | End: 2024-03-07 | Stop reason: SDUPTHER

## 2023-12-06 RX ORDER — ERGOCALCIFEROL 1.25 MG/1
1.25 CAPSULE ORAL
Qty: 12 CAPSULE | Refills: 1 | Status: SHIPPED | OUTPATIENT
Start: 2023-12-06

## 2023-12-06 RX ORDER — MONTELUKAST SODIUM 10 MG/1
10 TABLET ORAL DAILY
Qty: 90 TABLET | Refills: 0 | Status: SHIPPED | OUTPATIENT
Start: 2023-12-06 | End: 2024-05-10

## 2023-12-06 ASSESSMENT — PATIENT HEALTH QUESTIONNAIRE - PHQ9
10. IF YOU CHECKED OFF ANY PROBLEMS, HOW DIFFICULT HAVE THESE PROBLEMS MADE IT FOR YOU TO DO YOUR WORK, TAKE CARE OF THINGS AT HOME, OR GET ALONG WITH OTHER PEOPLE: SOMEWHAT DIFFICULT
8. MOVING OR SPEAKING SO SLOWLY THAT OTHER PEOPLE COULD HAVE NOTICED. OR THE OPPOSITE, BEING SO FIGETY OR RESTLESS THAT YOU HAVE BEEN MOVING AROUND A LOT MORE THAN USUAL: NOT AT ALL
2. FEELING DOWN, DEPRESSED OR HOPELESS: MORE THAN HALF THE DAYS
SUM OF ALL RESPONSES TO PHQ9 QUESTIONS 1 AND 2: 4
7. TROUBLE CONCENTRATING ON THINGS, SUCH AS READING THE NEWSPAPER OR WATCHING TELEVISION: MORE THAN HALF THE DAYS
5. POOR APPETITE OR OVEREATING: SEVERAL DAYS
9. THOUGHTS THAT YOU WOULD BE BETTER OFF DEAD, OR OF HURTING YOURSELF: SEVERAL DAYS
SUM OF ALL RESPONSES TO PHQ QUESTIONS 1-9: 14
4. FEELING TIRED OR HAVING LITTLE ENERGY: MORE THAN HALF THE DAYS
6. FEELING BAD ABOUT YOURSELF - OR THAT YOU ARE A FAILURE OR HAVE LET YOURSELF OR YOUR FAMILY DOWN: MORE THAN HALF THE DAYS
1. LITTLE INTEREST OR PLEASURE IN DOING THINGS: MORE THAN HALF THE DAYS
3. TROUBLE FALLING OR STAYING ASLEEP OR SLEEPING TOO MUCH: MORE THAN HALF THE DAYS

## 2023-12-06 ASSESSMENT — ANXIETY QUESTIONNAIRES
2. NOT BEING ABLE TO STOP OR CONTROL WORRYING: SEVERAL DAYS
IF YOU CHECKED OFF ANY PROBLEMS ON THIS QUESTIONNAIRE, HOW DIFFICULT HAVE THESE PROBLEMS MADE IT FOR YOU TO DO YOUR WORK, TAKE CARE OF THINGS AT HOME, OR GET ALONG WITH OTHER PEOPLE: VERY DIFFICULT
5. BEING SO RESTLESS THAT IT IS HARD TO SIT STILL: NOT AT ALL
3. WORRYING TOO MUCH ABOUT DIFFERENT THINGS: MORE THAN HALF THE DAYS
4. TROUBLE RELAXING: NOT AT ALL
1. FEELING NERVOUS, ANXIOUS, OR ON EDGE: NOT AT ALL
GAD7 TOTAL SCORE: 5
6. BECOMING EASILY ANNOYED OR IRRITABLE: SEVERAL DAYS
7. FEELING AFRAID AS IF SOMETHING AWFUL MIGHT HAPPEN: SEVERAL DAYS

## 2023-12-06 NOTE — ASSESSMENT & PLAN NOTE
ADD questionnaire to be completed, will upload to portal for patient to fill out before next visit.  Has used methylphenidate in past with success, awaiting full evaluation before prescribing stimulant.  Will start on trial of Wellbutrin (non-stimulant) in the interim as this can help with focus, mood, and appetite suppression.  Will recheck ADD at follow-up.  Patient also encourage to set up endo evaluation and complete previously ordered studies before next visit.    As always, consistent exercise and a healthy diet with limited refined carbohydrates can be helpful for maximizing focus.  This means limiting or eliminating such things as pretzels, pasta, cereals, breakfast bars, breads, pastries, and sugary drinks and  instead eating generous amounts of vegetables, lean proteins, and healthy fats such as those found in olive oil, avocados, fatty fishes such as salmon, and nuts .      Consistent exercise can also help with focus.   150 minutes of exercise per week divided up on multiple days is recommended.

## 2023-12-06 NOTE — ASSESSMENT & PLAN NOTE
Continue Prozac 40 mg daily  Start trial with Wellbutrin 150 mg daily for mood, focus, and appetite suppression.  Will check scales upon rooming at next visit.  Patient to complete previously ordered labs before follow-up.

## 2023-12-06 NOTE — ASSESSMENT & PLAN NOTE
Prescription strength vitamin D ordered today.  Hx of vitamin D deficiency, due to for recheck, ordered with lab evaluation awaiting patient completion.

## 2023-12-06 NOTE — ASSESSMENT & PLAN NOTE
Stable, Singulair and albuterol inhaler prescriptions refilled today, continue these as prescribed

## 2023-12-06 NOTE — TELEPHONE ENCOUNTER
Audelia please scan in a blank adult add brenna to Arlette for her to complete and send back at her convenience       Please cll her TOMORROW,   DEC 7)  and set up a follow up ov for add , lab  review  in person ov in 3 months w me    if we have 40 min that would be great -       Lots of issues to work through     Thank you

## 2023-12-06 NOTE — PROGRESS NOTES
Virtual or Telephone Consent    An interactive audio and video telecommunication system which permits real time communications between the patient (at the originating site) and provider (at the distant site) was utilized to provide this telehealth service.   Verbal consent was requested and obtained from Arlette Cedeño on this date, 12/06/23 for a telehealth visit.     Subjective   Patient ID: Arlette Cedeño is a 58 y.o. female who presents for Follow-up (Pt presents for follow up ADHD- pt states no other concerns at this time.).    HPI     VIRTUAL VISIT    Patient presents today for ADD evaluation.  States has not gotten her labs or imaging studies done, thought today was just for ADD discussion.    Patient has had symptoms of ADD since teens  Feels they have been getting progressively worse, now impacting both personal and professional relationships now.  Has had methylphenidate in past and this worked well for her.  Had improved focus and task completion  when she took this.  She has never tried Wellbutrin before,  Notes that mood is not any different with increase in Prozac    ROUTINE VISIT  CHRONIC CONDITIONS:     -Tremors  +FMHx of mom with parkinson's  Exam 10/2023 c/w fine tremor with intention, bilaterally, no dysdiadochokinesis.  Discussed benign essential tremor vs med side effect vs other.  Patient reassured that exam was not suggestive of Parkinson's.     Labs ordered 10/2023 including TFTs and B-12 - NOT DONE    -Mood  Hx of anxiety and depression.  Reports concern for ADD at 10/2023 OV, to make follow-up evaluation if so inclined.    Current regimen:  Prozac 40 mg daily, increased from 20 mg in 10/2023     -HLD + mild CAD + aortic atherosclerosis  Not currently on statin or ASA therapy.   Recommended statin therapy 8/2021 OV, patient deferred and opted to work on lifestyle measures.  8/2021 CACS was 89.   4/2021 TC/HDL ratio 5.1 ()     -Hyperglycemia  A1c low prediabetic range, lifestyle  managed  Unable to tolerate Metformin secondary to GI upset  4/2021 Hgb A1c 5.8.  11/2021 fasting glucose 103     -Hypothyroidism, post-surgical  Hx of thyroid carcinoma diagnosed in 2013  S/p surgery to remove parathyroids and thyroid.   Patient presently on Levothyroxine, suppressive dose.   4/2021 TSH 0.06, T4 normal, T3 normal. Antithyroglobulin antibody negative.     -Hypoparathyroidism  S/p surgery to remove parathyroids and thyroid in 2013 2/2 thyroid carcinoma  Taking Calcitriol 0.25 mcg 4 tablets daily + Calcium Acetate 667 mg 2 tablets daily.     -Hypocalcemia  Presently on Calcium Acetate 667 mg, 3 tablets daily.   11/2021 Calcium low at 8.3 (low normal 8.6. Ionized calcium low (1.05, normal 1.10).  4/2021 Calcium low at 8.1 (low normal 8.6). Ionized calcium low (1.00, normal 1.10).     -Vitamin D deficiency  Recently improved to sufficient range with prescription vitamin D (8/2021)  11/2021 Vit D level normal (39)  4/2021 Vit D level low (22).     -Lung nodule  3-4 mm lateral right, incidental finding on 8/2021 CACS.   Because this is less than 6 mm no follow-up necessary per Fleischner Criteria.   Patient non-smoker but would like to have repeat CT done in 6 months due to family history (sister with hx of breast cancer spread to lymph nodes) and personal history of thyroid carcinoma.     11/2021 CT Chest showed stable LLL nodule but new RLL nodule not in field of view on prior exam.   Radiology recommended considering 2 year follow-up from initial CT cardiac scoring exam to demonstrate stability.      Repeat due 2023.     Review of Systems   All other systems reviewed and are negative.      Objective   There were no vitals taken for this visit.    Physical Exam  Nursing note reviewed.     Visit conducted via telehealth in light of COVID-19 pandemic.    Video used     Gen: patient is alert and oriented x 3  pleasant, and in no apparent distress.  Patient appears well, no cough, no dyspnea/tachypnea  observed on video.   Psychiatric: Patient has good eye contact. Mood and affect are appropriate.     Assessment/Plan   Problem List Items Addressed This Visit             ICD-10-CM    Anxiety and depression F41.9, F32.A     Continue Prozac 40 mg daily  Start trial with Wellbutrin 150 mg daily for mood, focus, and appetite suppression.  Will check scales upon rooming at next visit.  Patient to complete previously ordered labs before follow-up.         Relevant Medications    buPROPion XL (Wellbutrin XL) 150 mg 24 hr tablet    Asthma J45.909     Stable, Singulair and albuterol inhaler prescriptions refilled today, continue these as prescribed         Relevant Medications    montelukast (Singulair) 10 mg tablet    albuterol 90 mcg/actuation inhaler    Inattention R41.840     ADD questionnaire to be completed, will upload to portal for patient to fill out before next visit.  Has used methylphenidate in past with success, awaiting full evaluation before prescribing stimulant.  Will start on trial of Wellbutrin (non-stimulant) in the interim as this can help with focus, mood, and appetite suppression.  Will recheck ADD at follow-up.  Patient also encourage to set up endo evaluation and complete previously ordered studies before next visit.    As always, consistent exercise and a healthy diet with limited refined carbohydrates can be helpful for maximizing focus.  This means limiting or eliminating such things as pretzels, pasta, cereals, breakfast bars, breads, pastries, and sugary drinks and  instead eating generous amounts of vegetables, lean proteins, and healthy fats such as those found in olive oil, avocados, fatty fishes such as salmon, and nuts .      Consistent exercise can also help with focus.   150 minutes of exercise per week divided up on multiple days is recommended.          Relevant Medications    buPROPion XL (Wellbutrin XL) 150 mg 24 hr tablet    Vitamin D deficiency - Primary E55.9     Prescription  strength vitamin D ordered today.  Hx of vitamin D deficiency, due to for recheck, ordered with lab evaluation awaiting patient completion.         Relevant Medications    ergocalciferol (Vitamin D-2) 1.25 MG (19984 UT) capsule       Follow-up in 3 months for recheck ADD + review labs/imaging studies.  Scales upon rooming.   ADD questionnaire to be completed to prior (to be uploaded to portal)  Call for sooner follow-up if needed.       Scribe Attestation  By signing my name below, I, Savage Booth   attest that this documentation has been prepared under the direction and in the presence of Meghan Holland DO.

## 2023-12-11 ENCOUNTER — LAB (OUTPATIENT)
Dept: LAB | Facility: LAB | Age: 58
End: 2023-12-11
Payer: COMMERCIAL

## 2023-12-11 DIAGNOSIS — E89.2 HYPOPARATHYROIDISM AFTER PROCEDURE (MULTI): ICD-10-CM

## 2023-12-11 DIAGNOSIS — Z00.00 HEALTHCARE MAINTENANCE: ICD-10-CM

## 2023-12-11 DIAGNOSIS — R73.09 ELEVATED GLUCOSE: ICD-10-CM

## 2023-12-11 DIAGNOSIS — E89.0 HYPOTHYROIDISM, POSTSURGICAL: ICD-10-CM

## 2023-12-11 DIAGNOSIS — R25.1 TREMOR: ICD-10-CM

## 2023-12-11 DIAGNOSIS — Z85.850 HISTORY OF THYROID CANCER: ICD-10-CM

## 2023-12-11 LAB
25(OH)D3 SERPL-MCNC: 22 NG/ML (ref 30–100)
ALBUMIN SERPL BCP-MCNC: 3.8 G/DL (ref 3.4–5)
ALP SERPL-CCNC: 46 U/L (ref 33–110)
ALT SERPL W P-5'-P-CCNC: 37 U/L (ref 7–45)
ANION GAP SERPL CALC-SCNC: 14 MMOL/L (ref 10–20)
AST SERPL W P-5'-P-CCNC: 23 U/L (ref 9–39)
BILIRUB SERPL-MCNC: 0.3 MG/DL (ref 0–1.2)
BUN SERPL-MCNC: 22 MG/DL (ref 6–23)
CALCIUM SERPL-MCNC: 7.4 MG/DL (ref 8.6–10.6)
CHLORIDE SERPL-SCNC: 107 MMOL/L (ref 98–107)
CHOLEST SERPL-MCNC: 221 MG/DL (ref 0–199)
CHOLESTEROL/HDL RATIO: 4.5
CO2 SERPL-SCNC: 26 MMOL/L (ref 21–32)
CREAT SERPL-MCNC: 0.78 MG/DL (ref 0.5–1.05)
ERYTHROCYTE [DISTWIDTH] IN BLOOD BY AUTOMATED COUNT: 12.8 % (ref 11.5–14.5)
EST. AVERAGE GLUCOSE BLD GHB EST-MCNC: 126 MG/DL
GFR SERPL CREATININE-BSD FRML MDRD: 89 ML/MIN/1.73M*2
GLUCOSE SERPL-MCNC: 120 MG/DL (ref 74–99)
HBA1C MFR BLD: 6 %
HCT VFR BLD AUTO: 42.2 % (ref 36–46)
HDLC SERPL-MCNC: 48.9 MG/DL
HGB BLD-MCNC: 13.3 G/DL (ref 12–16)
LDLC SERPL CALC-MCNC: 143 MG/DL
MCH RBC QN AUTO: 28.3 PG (ref 26–34)
MCHC RBC AUTO-ENTMCNC: 31.5 G/DL (ref 32–36)
MCV RBC AUTO: 90 FL (ref 80–100)
NON HDL CHOLESTEROL: 172 MG/DL (ref 0–149)
NRBC BLD-RTO: 0 /100 WBCS (ref 0–0)
PHOSPHATE SERPL-MCNC: 4.4 MG/DL (ref 2.5–4.9)
PLATELET # BLD AUTO: 254 X10*3/UL (ref 150–450)
POTASSIUM SERPL-SCNC: 4.4 MMOL/L (ref 3.5–5.3)
PROT SERPL-MCNC: 6.7 G/DL (ref 6.4–8.2)
RBC # BLD AUTO: 4.7 X10*6/UL (ref 4–5.2)
SODIUM SERPL-SCNC: 143 MMOL/L (ref 136–145)
T3FREE SERPL-MCNC: 3.2 PG/ML (ref 2.3–4.2)
T4 FREE SERPL-MCNC: 1.18 NG/DL (ref 0.78–1.48)
TRIGL SERPL-MCNC: 144 MG/DL (ref 0–149)
TSH SERPL-ACNC: 0.55 MIU/L (ref 0.44–3.98)
VIT B12 SERPL-MCNC: 696 PG/ML (ref 211–911)
VLDL: 29 MG/DL (ref 0–40)
WBC # BLD AUTO: 6.9 X10*3/UL (ref 4.4–11.3)

## 2023-12-11 PROCEDURE — 80053 COMPREHEN METABOLIC PANEL: CPT

## 2023-12-11 PROCEDURE — 84439 ASSAY OF FREE THYROXINE: CPT

## 2023-12-11 PROCEDURE — 85027 COMPLETE CBC AUTOMATED: CPT

## 2023-12-11 PROCEDURE — 80061 LIPID PANEL: CPT

## 2023-12-11 PROCEDURE — 82607 VITAMIN B-12: CPT

## 2023-12-11 PROCEDURE — 84100 ASSAY OF PHOSPHORUS: CPT

## 2023-12-11 PROCEDURE — 84443 ASSAY THYROID STIM HORMONE: CPT

## 2023-12-11 PROCEDURE — 84481 FREE ASSAY (FT-3): CPT

## 2023-12-11 PROCEDURE — 36415 COLL VENOUS BLD VENIPUNCTURE: CPT

## 2023-12-11 PROCEDURE — 82306 VITAMIN D 25 HYDROXY: CPT

## 2023-12-11 PROCEDURE — 83036 HEMOGLOBIN GLYCOSYLATED A1C: CPT

## 2023-12-20 ENCOUNTER — TELEPHONE (OUTPATIENT)
Dept: PRIMARY CARE | Facility: CLINIC | Age: 58
End: 2023-12-20

## 2023-12-20 NOTE — TELEPHONE ENCOUNTER
Patient calling because she is having a hard time controlling her emotions; feels very sad and helpless     She states she had to leave work today because she could not stop crying, just feels overwhelmed    She was prescribed wellbutrin 12/6, stopped fluoxetine 12/17 and then started wellbutrin next day 12/18    Is this how she was supposed to switch medications?  Is there a time frame where emotions will be high while taking effect?    Please advise

## 2024-01-26 DIAGNOSIS — E89.0 HYPOTHYROIDISM, POSTSURGICAL: ICD-10-CM

## 2024-01-26 RX ORDER — LEVOTHYROXINE SODIUM 125 UG/1
125 TABLET ORAL DAILY
Qty: 90 TABLET | Refills: 0 | Status: SHIPPED | OUTPATIENT
Start: 2024-01-26 | End: 2024-04-23 | Stop reason: SDUPTHER

## 2024-01-29 ENCOUNTER — TELEPHONE (OUTPATIENT)
Dept: PRIMARY CARE | Facility: CLINIC | Age: 59
End: 2024-01-29
Payer: COMMERCIAL

## 2024-01-29 NOTE — TELEPHONE ENCOUNTER
Called and spoke with pt, and she states that she is positive for COVID. She states that she tested positive today 1/29/24.

## 2024-01-30 ENCOUNTER — TELEMEDICINE (OUTPATIENT)
Dept: PRIMARY CARE | Facility: CLINIC | Age: 59
End: 2024-01-30
Payer: COMMERCIAL

## 2024-01-30 ENCOUNTER — APPOINTMENT (OUTPATIENT)
Dept: PRIMARY CARE | Facility: CLINIC | Age: 59
End: 2024-01-30
Payer: COMMERCIAL

## 2024-01-30 DIAGNOSIS — U07.1 COVID-19: Primary | ICD-10-CM

## 2024-01-30 DIAGNOSIS — J45.909 MILD ASTHMA, UNSPECIFIED WHETHER COMPLICATED, UNSPECIFIED WHETHER PERSISTENT (HHS-HCC): ICD-10-CM

## 2024-01-30 PROCEDURE — 99213 OFFICE O/P EST LOW 20 MIN: CPT | Performed by: FAMILY MEDICINE

## 2024-01-30 ASSESSMENT — ENCOUNTER SYMPTOMS
RHINORRHEA: 1
COUGH: 1
WHEEZING: 0
FATIGUE: 1
FEVER: 1
SINUS PRESSURE: 1
SHORTNESS OF BREATH: 0
CHEST TIGHTNESS: 0
CHILLS: 1

## 2024-01-30 NOTE — PROGRESS NOTES
Subjective   Patient ID: Arlette Cedeño is a 58 y.o. female who presents for COVID (TESTED POSITIVE YESTERDAY/Symptoms started Sunday/Current symptoms, achy, dizzy fevers. 101.5 ).    HPI     This visit was completed via telehealth with audio and video.  All issues as below were discussed and addressed.  The patient verbally consented to the visit.     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      buPROPion XL (Wellbutrin XL) 150 mg 24 hr tablet Take 1 tablet (150 mg) by mouth once daily in the morning. Do not crush, chew, or split. 30 tablet 1    calcitriol (Rocaltrol) 0.25 mcg capsule Take 1 capsule (0.25 mcg) by mouth 5 times a day.      calcium acetate (Phoslo) 667 mg tablet Take 3 tablets (2,001 mg) by mouth once daily.      clindamycin (Cleocin T) 1 % lotion 1 Application      ergocalciferol (Vitamin D-2) 1.25 MG (66971 UT) capsule Take 1 capsule (1,250 mcg) by mouth 1 (one) time per week. 12 capsule 1    FLUoxetine (PROzac) 20 mg capsule Take 2 capsules (40 mg) by mouth once daily. 180 capsule 0    levothyroxine (Synthroid, Levoxyl) 125 mcg tablet Take 1 tablet (125 mcg) by mouth once daily. 90 tablet 0    mometasone-formoterol (Dulera) 200-5 mcg/actuation  inhaler Inhale 2 puffs 2 times a day. 30 g 3    montelukast (Singulair) 10 mg tablet Take 1 tablet (10 mg) by mouth once daily. 90 tablet 0    triamcinolone (Kenalog) 0.1 % cream Apply 1 Application topically 2 times a day.       No current facility-administered medications for this visit.       Review of Systems   Constitutional:  Positive for chills, fatigue and fever.   HENT:  Positive for congestion, rhinorrhea and sinus pressure.    Respiratory:  Positive for cough. Negative for chest tightness, shortness of breath and wheezing.    Cardiovascular:  Negative for chest pain.         Objective   There were no vitals taken for this visit.    Physical Exam      Constitutional: Well developed, well nourished, alert and in no acute distress.  Head and Face: NC/AT  Eyes: Normal external exam.  Pulmonary: No respiratory distress  Skin: Warm, well perfused, normal skin turgor and color.   Neurologic: Cranial nerves II-XII grossly intact.      Assessment/Plan   Problem List Items Addressed This Visit             ICD-10-CM    Asthma J45.909     Other Visit Diagnoses         Codes    COVID-19    -  Primary U07.1    Relevant Medications    nirmatrelvir-ritonavir (PAXLOVID) 300 mg (150 mg x 2)-100 mg tablet therapy pack            She can return to work on Monday Feb. 5th, 2024.  Wear a mask through 2/7/24.    Paxlovid sent to pharmacy.   Advised her to closely monitor breathing due to her history of asthma.  She does not feel it is an asthma exacerbation at this time.  Advised her to call back if worsening for prednisone rx.       Shruthi Quiles, DO  1/30/2024

## 2024-02-13 ENCOUNTER — APPOINTMENT (OUTPATIENT)
Dept: PRIMARY CARE | Facility: CLINIC | Age: 59
End: 2024-02-13
Payer: COMMERCIAL

## 2024-02-19 DIAGNOSIS — E89.2 HYPOPARATHYROIDISM AFTER PROCEDURE (MULTI): Primary | ICD-10-CM

## 2024-02-19 RX ORDER — CALCITRIOL 0.25 UG/1
0.25 CAPSULE ORAL
Qty: 140 CAPSULE | Refills: 0 | Status: SHIPPED | OUTPATIENT
Start: 2024-02-19 | End: 2024-03-07 | Stop reason: SDUPTHER

## 2024-03-07 ENCOUNTER — TELEPHONE (OUTPATIENT)
Dept: PRIMARY CARE | Facility: CLINIC | Age: 59
End: 2024-03-07
Payer: COMMERCIAL

## 2024-03-07 DIAGNOSIS — E89.2 HYPOPARATHYROIDISM AFTER PROCEDURE (MULTI): ICD-10-CM

## 2024-03-07 DIAGNOSIS — F41.9 ANXIETY AND DEPRESSION: ICD-10-CM

## 2024-03-07 DIAGNOSIS — E83.51 HYPOCALCEMIA: ICD-10-CM

## 2024-03-07 DIAGNOSIS — E55.9 VITAMIN D DEFICIENCY: ICD-10-CM

## 2024-03-07 DIAGNOSIS — E89.0 HYPOTHYROIDISM, POSTSURGICAL: Primary | ICD-10-CM

## 2024-03-07 DIAGNOSIS — R41.840 INATTENTION: ICD-10-CM

## 2024-03-07 DIAGNOSIS — F32.A ANXIETY AND DEPRESSION: ICD-10-CM

## 2024-03-07 RX ORDER — BUPROPION HYDROCHLORIDE 150 MG/1
150 TABLET ORAL EVERY MORNING
Qty: 30 TABLET | Refills: 1 | Status: SHIPPED | OUTPATIENT
Start: 2024-03-07 | End: 2024-05-06

## 2024-03-07 RX ORDER — CALCITRIOL 0.25 UG/1
0.25 CAPSULE ORAL
Qty: 140 CAPSULE | Refills: 0 | Status: SHIPPED | OUTPATIENT
Start: 2024-03-07 | End: 2024-04-04 | Stop reason: SDUPTHER

## 2024-03-07 NOTE — TELEPHONE ENCOUNTER
Patient asking for repeat thyroid labs; would like these sent to endo once resulted    Did make an appointment with Endo for May, sent referral/office note to Dr. Funk at [phone]

## 2024-03-11 NOTE — TELEPHONE ENCOUNTER
Orders placed   Pt to have done before she follows up in May w endo    we can fax results there   Nonfasting is ok

## 2024-03-25 ENCOUNTER — APPOINTMENT (OUTPATIENT)
Dept: PRIMARY CARE | Facility: CLINIC | Age: 59
End: 2024-03-25
Payer: COMMERCIAL

## 2024-04-02 ENCOUNTER — HOSPITAL ENCOUNTER (OUTPATIENT)
Dept: RADIOLOGY | Facility: CLINIC | Age: 59
Discharge: HOME | End: 2024-04-02
Payer: COMMERCIAL

## 2024-04-02 VITALS — WEIGHT: 220 LBS | HEIGHT: 64 IN | BODY MASS INDEX: 37.56 KG/M2

## 2024-04-02 DIAGNOSIS — Z12.31 ENCOUNTER FOR SCREENING MAMMOGRAM FOR MALIGNANT NEOPLASM OF BREAST: ICD-10-CM

## 2024-04-02 PROCEDURE — 77067 SCR MAMMO BI INCL CAD: CPT

## 2024-04-02 PROCEDURE — 77067 SCR MAMMO BI INCL CAD: CPT | Performed by: RADIOLOGY

## 2024-04-02 PROCEDURE — 77063 BREAST TOMOSYNTHESIS BI: CPT | Performed by: RADIOLOGY

## 2024-04-04 ENCOUNTER — TELEPHONE (OUTPATIENT)
Dept: PRIMARY CARE | Facility: CLINIC | Age: 59
End: 2024-04-04
Payer: COMMERCIAL

## 2024-04-04 DIAGNOSIS — F41.9 ANXIETY AND DEPRESSION: ICD-10-CM

## 2024-04-04 DIAGNOSIS — E89.2 HYPOPARATHYROIDISM AFTER PROCEDURE (MULTI): ICD-10-CM

## 2024-04-04 DIAGNOSIS — F32.A ANXIETY AND DEPRESSION: ICD-10-CM

## 2024-04-04 RX ORDER — CALCITRIOL 0.25 UG/1
0.25 CAPSULE ORAL
Qty: 140 CAPSULE | Refills: 0 | Status: SHIPPED | OUTPATIENT
Start: 2024-04-04 | End: 2024-05-14 | Stop reason: SDUPTHER

## 2024-04-04 RX ORDER — FLUOXETINE HYDROCHLORIDE 20 MG/1
40 CAPSULE ORAL DAILY
Qty: 180 CAPSULE | Refills: 0 | Status: SHIPPED | OUTPATIENT
Start: 2024-04-04 | End: 2025-04-04

## 2024-04-04 NOTE — TELEPHONE ENCOUNTER
Patient called in said that she called the pharmacy  to have her Calcitriol .25 mcg capsule and Fluoxetine 20 mg capsule had been cancelled.   She is completely out of both meds.  Can please send to [facility]      Please call when it has been sent over

## 2024-04-23 ENCOUNTER — OFFICE VISIT (OUTPATIENT)
Dept: PRIMARY CARE | Facility: CLINIC | Age: 59
End: 2024-04-23
Payer: COMMERCIAL

## 2024-04-23 VITALS
BODY MASS INDEX: 39.36 KG/M2 | DIASTOLIC BLOOD PRESSURE: 72 MMHG | HEART RATE: 72 BPM | SYSTOLIC BLOOD PRESSURE: 109 MMHG | TEMPERATURE: 98.3 F | RESPIRATION RATE: 14 BRPM | WEIGHT: 229.3 LBS | OXYGEN SATURATION: 96 %

## 2024-04-23 DIAGNOSIS — F32.A ANXIETY AND DEPRESSION: ICD-10-CM

## 2024-04-23 DIAGNOSIS — L73.2 HIDRADENITIS SUPPURATIVA: ICD-10-CM

## 2024-04-23 DIAGNOSIS — E89.2 HYPOPARATHYROIDISM AFTER PROCEDURE (MULTI): ICD-10-CM

## 2024-04-23 DIAGNOSIS — E78.5 HYPERLIPIDEMIA, UNSPECIFIED HYPERLIPIDEMIA TYPE: ICD-10-CM

## 2024-04-23 DIAGNOSIS — Z00.00 HEALTHCARE MAINTENANCE: ICD-10-CM

## 2024-04-23 DIAGNOSIS — H60.543 ECZEMA OF BOTH EXTERNAL EARS: ICD-10-CM

## 2024-04-23 DIAGNOSIS — E89.0 HYPOTHYROIDISM, POSTSURGICAL: ICD-10-CM

## 2024-04-23 DIAGNOSIS — E83.51 HYPOCALCEMIA: ICD-10-CM

## 2024-04-23 DIAGNOSIS — R73.09 ELEVATED GLUCOSE: Primary | ICD-10-CM

## 2024-04-23 DIAGNOSIS — F41.9 ANXIETY AND DEPRESSION: ICD-10-CM

## 2024-04-23 LAB — POC HEMOGLOBIN A1C: 6 % (ref 4.2–6.5)

## 2024-04-23 PROCEDURE — 3008F BODY MASS INDEX DOCD: CPT | Performed by: FAMILY MEDICINE

## 2024-04-23 PROCEDURE — 83036 HEMOGLOBIN GLYCOSYLATED A1C: CPT | Performed by: FAMILY MEDICINE

## 2024-04-23 PROCEDURE — 99214 OFFICE O/P EST MOD 30 MIN: CPT | Performed by: FAMILY MEDICINE

## 2024-04-23 PROCEDURE — 1036F TOBACCO NON-USER: CPT | Performed by: FAMILY MEDICINE

## 2024-04-23 RX ORDER — TRIAMCINOLONE ACETONIDE 1 MG/G
1 CREAM TOPICAL 2 TIMES DAILY
Qty: 45 G | Refills: 0 | Status: SHIPPED | OUTPATIENT
Start: 2024-04-23 | End: 2024-05-10

## 2024-04-23 RX ORDER — CLINDAMYCIN PHOSPHATE 10 UG/ML
LOTION TOPICAL
Qty: 60 ML | Refills: 3 | Status: SHIPPED | OUTPATIENT
Start: 2024-04-23

## 2024-04-23 RX ORDER — LEVOTHYROXINE SODIUM 125 UG/1
125 TABLET ORAL DAILY
Qty: 90 TABLET | Refills: 0 | Status: SHIPPED | OUTPATIENT
Start: 2024-04-23 | End: 2025-04-23

## 2024-04-23 RX ORDER — ROSUVASTATIN CALCIUM 10 MG/1
10 TABLET, COATED ORAL DAILY
Qty: 30 TABLET | Refills: 1 | Status: SHIPPED | OUTPATIENT
Start: 2024-04-23 | End: 2025-05-28

## 2024-04-23 NOTE — ASSESSMENT & PLAN NOTE
Post-surgical hypothyroidism due to thyroid carcinoma diagnosed in 2013  S/p surgery to remove parathyroids and thyroid.   Patient presently on Levothyroxine, suppressive dose.     Patient awaiting evaluation with new endocrinologist, Dr. Funk, scheduled for May 2024   Continue present dose of Synthroid, further med changes/recs deferred to endo  Repeat labs ordered previously, patient can have these done prior to upcoming endo eval for them to review.

## 2024-04-23 NOTE — ASSESSMENT & PLAN NOTE
08/2021 CACS was 89.     12/2023 TC/HDL ratio 4.5 ()   Goal TC/HDL ratio 3.4 or less, LDL 99 or less,  or less, and TRIG 150 or less.     Statin therapy is used to stabilize and regress plaque build up. Patients typically tolerate statin therapy well. The most common side effect of statin therapy is muscle aches and cramping. To protect against muscle cramping Recommend supplementation with Vitamin D3 8346-4459 IU daily, which can be purchased over the counter.    Trial of Rosuvastatin 10 mg daily sent to pharmacy - can take in PM before bed.  Patient already on vitamin D supplement, continue this.  Consider adding on CoQ10 supplement if experiencing cramping.    To lower this with lifestyle measures:  -make sure you are avoiding refined carbs such as breads, pasta, cereal, candy, soda,  nutrition bars, granola, chips, and sugar sweetened beverages.      -eat 5- 7 servings daily of veggies,  healthy protein such as chicken, fish,  beans, and eggs, and include healthy fats in your diet such as seeds, nuts, olive oil, avocados, and salmon.   -exercise 4 - 6 days per week as you are able, 150 minutes total weekly divided up is recommended with 3-4 of those days including resistance/strength training.  -consider taking the fiber product psyllium capsules or powder 2 times daily (generic brand is fine) , or a brand name psyllium such as [employer] or Metamucil.  -consider also adding plant stanols and sterols such as Nature Made CholestOff, available over the counter.

## 2024-04-23 NOTE — ASSESSMENT & PLAN NOTE
Patient awaiting evaluation with new endocrinologist, Dr. Funk, scheduled for May 2024   Continue present regimen, further med changes/recs deferred to endo  Repeat labs ordered previously, patient can have these done prior to upcoming endo eval for them to review.

## 2024-04-23 NOTE — ASSESSMENT & PLAN NOTE
Continue Prozac 40 mg daily.    Continue Wellbutrin 150 mg daily, recommend you take this when you first break your fast in the AM. I would avoid taking this too late in the day as this can cause some insomnia.    Will check scales upon rooming at next visit.

## 2024-04-23 NOTE — ASSESSMENT & PLAN NOTE
Hypocalcemia, on Calcium Acetate 667 mg, 3 tablets daily.   S/p surgery to remove parathyroids and thyroid in 2013 2/2 thyroid carcinoma     Patient awaiting evaluation with new endocrinologist, Dr. Funk, scheduled for May 2024   Continue present dose of Calcium Acetate, further med changes/recs deferred to endo  Repeat labs ordered previously, patient can have these done prior to upcoming endo eval for them to review.

## 2024-04-23 NOTE — ASSESSMENT & PLAN NOTE
04/2024 Hgb A1c 6.0  12/2023 glucose 120, hgb A1c 6.0    A1c remains stable in low prediabetic range, lifestyle managed  Unable to tolerate Metformin secondary to GI upset.  Patient commended on her healthy efforts with nutrition and activity.  Diet and exercise recommendations revisited, see hyperlipidemia A/P

## 2024-04-23 NOTE — PROGRESS NOTES
Subjective   Patient ID: Arlette Cedeño is a 58 y.o. female who presents for Follow-up (Pt presents to address sugars and chol- pt states no new concerns at this time. ) and Flu Vaccine (Pt declines flu vaccine at this time. /).    HPI     Patient presents today to discuss sugars and cholesterol.    Requesting refill for Clinda gel, uses when having flare of HS, as not had flare recently.    Requesting refill for triamcinolone cream, uses for her eczema.    She has found an new endo, scheduled to see them at end of May 2024    Had mammogram done, this was normal.  She is scheduled for her colonoscopy in June 2024      CHRONIC CONDITIONS:   Mood  Hx of anxiety and depression    Current regimen:  Prozac 40 mg daily, last increased 10/2023  Wellbutrin 150 mg daily, started 12/2023    Patient is taking and tolerating the medications as prescribed.   Patient denies suicidal ideation or homicidal ideation.   Patient denies any symptoms of deana such as pressured speech, impulsive purchases.  Patient reports good control on the current medication.  Patient is satisfied with their current regimen and wishes to continue.     Hyperglycemia  A1c low prediabetic range, lifestyle managed  Unable to tolerate Metformin secondary to GI upset    04/2024 Hgb A1c 6.0  12/2023 glucose 120, hgb A1c 6.0  11/2021 glucose 103  04/2021 glucose 85, hgb A1c 5.8.    Started doing IF with 16 hours fasting  3 days no diet coke, working on water intake.    Hyperlipidemia/CAD/aortic atherosclerosis  Not currently on statin or ASA therapy.   Recommended statin therapy 8/2021 OV, patient deferred and opted to work on lifestyle measures.  08/2021 CACS was 89.     12/2023 TC/HDL ratio 4.5 ()    Hypothyroidism  Scheduled for endo jessica, Dr. Funk, May 2024    Thyroid carcinoma diagnosed in 2013  S/p surgery to remove parathyroids and thyroid.   Patient presently on Levothyroxine, suppressive dose.   4/2021 Antithyroglobulin antibody  negative.    12/2023 TSH 0.55, T4 1.18, T3 3.2    Hypoparathyroidism, post-op  s/p surgery to remove parathyroids and thyroid in 2013 2/2 thyroid carcinoma  Taking Calcitriol 0.25 mcg 4 tablets daily + Calcium Acetate 667 mg 2 tablets daily.    Scheduled for endo eval, Dr. Funk, May 2024    Hypocalcemia  On Calcium Acetate 667 mg, 3 tablets daily.     12/2023 Calcium low at 7.4, vit D low at 22  11/2021 Calcium low at 8.3 (low normal 8.6). Ionized calcium low (1.05, normal 1.10).  04/2021 Calcium low at 8.1 (low normal 8.6). Ionized calcium low (1.00, normal 1.10).    Scheduled for endo eval, Dr. Funk, May 2024    Lung nodule  3-4 mm lateral right, incidental finding on 8/2021 CACS.   Because this is less than 6 mm no follow-up necessary per Fleischner Criteria.   Patient non-smoker but would like to have repeat CT done in 6 months due to family history (sister with hx of breast cancer spread to lymph nodes) and personal history of thyroid carcinoma.     11/2021 CT Chest showed stable LLL nodule but new RLL nodule not in field of view on prior exam. Radiology recommended considering 2 year follow-up from initial CT cardiac scoring exam to demonstrate stability.      Repeat due 2023, CT chest ordered 10/2023.    Review of Systems   All other systems reviewed and are negative.      Objective   /72 (BP Location: Right arm, Patient Position: Sitting, BP Cuff Size: Large adult)   Pulse 72   Temp 36.8 °C (98.3 °F) (Temporal)   Resp 14   Wt 104 kg (229 lb 4.8 oz)   SpO2 96%   BMI 39.36 kg/m²     Physical Exam  Vitals and nursing note reviewed.   Constitutional:       General: She is not in acute distress.     Appearance: Normal appearance. She is not toxic-appearing.   HENT:      Head: Normocephalic and atraumatic.   Neck:      Thyroid: No thyromegaly.   Cardiovascular:      Rate and Rhythm: Normal rate and regular rhythm.      Heart sounds: No murmur heard.     No friction rub. No gallop.   Pulmonary:       Effort: Pulmonary effort is normal.      Breath sounds: Normal breath sounds. No wheezing, rhonchi or rales.   Abdominal:      General: Bowel sounds are normal. There is no distension.      Palpations: Abdomen is soft. There is no mass.      Tenderness: There is no abdominal tenderness. There is no guarding.   Musculoskeletal:      Right lower leg: No edema.      Left lower leg: No edema.   Lymphadenopathy:      Cervical: No cervical adenopathy.   Neurological:      General: No focal deficit present.      Mental Status: She is alert and oriented to person, place, and time.   Psychiatric:         Mood and Affect: Mood normal.         Behavior: Behavior normal.         Assessment/Plan   Problem List Items Addressed This Visit             ICD-10-CM    Anxiety and depression F41.9, F32.A     Continue Prozac 40 mg daily.    Continue Wellbutrin 150 mg daily, recommend you take this when you first break your fast in the AM. I would avoid taking this too late in the day as this can cause some insomnia.    Will check scales upon rooming at next visit.         Hyperlipidemia E78.5     08/2021 CACS was 89.     12/2023 TC/HDL ratio 4.5 ()   Goal TC/HDL ratio 3.4 or less, LDL 99 or less,  or less, and TRIG 150 or less.     Statin therapy is used to stabilize and regress plaque build up. Patients typically tolerate statin therapy well. The most common side effect of statin therapy is muscle aches and cramping. To protect against muscle cramping Recommend supplementation with Vitamin D3 9046-2745 IU daily, which can be purchased over the counter.    Trial of Rosuvastatin 10 mg daily sent to pharmacy - can take in PM before bed.  Patient already on vitamin D supplement, continue this.  Consider adding on CoQ10 supplement if experiencing cramping.    To lower this with lifestyle measures:  -make sure you are avoiding refined carbs such as breads, pasta, cereal, candy, soda,  nutrition bars, granola, chips, and sugar  sweetened beverages.      -eat 5- 7 servings daily of veggies,  healthy protein such as chicken, fish,  beans, and eggs, and include healthy fats in your diet such as seeds, nuts, olive oil, avocados, and salmon.   -exercise 4 - 6 days per week as you are able, 150 minutes total weekly divided up is recommended with 3-4 of those days including resistance/strength training.  -consider taking the fiber product psyllium capsules or powder 2 times daily (generic brand is fine) , or a brand name psyllium such as [employer] or Metamucil.  -consider also adding plant stanols and sterols such as Nature Made CholestOff, available over the counter.          Relevant Medications    rosuvastatin (Crestor) 10 mg tablet    Hypothyroidism, postsurgical E89.0     Post-surgical hypothyroidism due to thyroid carcinoma diagnosed in 2013  S/p surgery to remove parathyroids and thyroid.   Patient presently on Levothyroxine, suppressive dose.     Patient awaiting evaluation with new endocrinologist, Dr. Funk, scheduled for May 2024   Continue present dose of Synthroid, further med changes/recs deferred to endo  Repeat labs ordered previously, patient can have these done prior to upcoming endo eval for them to review.         Relevant Medications    levothyroxine (Synthroid, Levoxyl) 125 mcg tablet    Elevated glucose - Primary R73.09     04/2024 Hgb A1c 6.0  12/2023 glucose 120, hgb A1c 6.0    A1c remains stable in low prediabetic range, lifestyle managed  Unable to tolerate Metformin secondary to GI upset.  Patient commended on her healthy efforts with nutrition and activity.  Diet and exercise recommendations revisited, see hyperlipidemia A/P         Relevant Orders    POCT glycosylated hemoglobin (Hb A1C) manually resulted (Completed)    Hidradenitis suppurativa L73.2     Clinda gel refilled at patient request, uses when having flare of HS, has not had flare recently.         Relevant Medications    clindamycin (Cleocin T) 1  % lotion    Hypocalcemia E83.51     Hypocalcemia, on Calcium Acetate 667 mg, 3 tablets daily.   S/p surgery to remove parathyroids and thyroid in 2013 2/2 thyroid carcinoma     Patient awaiting evaluation with new endocrinologist, Dr. Funk, scheduled for May 2024   Continue present dose of Calcium Acetate, further med changes/recs deferred to endo  Repeat labs ordered previously, patient can have these done prior to upcoming endo eval for them to review.         Hypoparathyroidism after procedure (Multi) E89.2     Patient awaiting evaluation with new endocrinologist, Dr. Funk, scheduled for May 2024   Continue present regimen, further med changes/recs deferred to endo  Repeat labs ordered previously, patient can have these done prior to upcoming endo eval for them to review.         Healthcare maintenance Z00.00     CPE due fall 22024, labs ordered today to be done prior to this visit.          Other Visit Diagnoses         Codes    Eczema of both external ears     H60.543    Relevant Medications    triamcinolone (Kenalog) 0.1 % cream          7-9-24   released labs to be done while pt in lab today per request       Follow-up in 6 months for routine care + CPE.  Labs to be done prior.   Call for sooner follow-up if needed.     -    Scribe Attestation  By signing my name below, ILeyla Scribe   attest that this documentation has been prepared under the direction and in the presence of Meghan Holland DO.

## 2024-05-10 DIAGNOSIS — H60.543 ECZEMA OF BOTH EXTERNAL EARS: ICD-10-CM

## 2024-05-10 DIAGNOSIS — J45.909 MILD ASTHMA, UNSPECIFIED WHETHER COMPLICATED, UNSPECIFIED WHETHER PERSISTENT (HHS-HCC): ICD-10-CM

## 2024-05-10 RX ORDER — MONTELUKAST SODIUM 10 MG/1
10 TABLET ORAL DAILY
Qty: 60 TABLET | Refills: 0 | Status: SHIPPED | OUTPATIENT
Start: 2024-05-10

## 2024-05-10 RX ORDER — TRIAMCINOLONE ACETONIDE 1 MG/G
1 CREAM TOPICAL 2 TIMES DAILY
Qty: 45 G | Refills: 0 | Status: SHIPPED | OUTPATIENT
Start: 2024-05-10

## 2024-05-14 DIAGNOSIS — E89.2 HYPOPARATHYROIDISM AFTER PROCEDURE (MULTI): ICD-10-CM

## 2024-05-14 RX ORDER — CALCITRIOL 0.25 UG/1
0.25 CAPSULE ORAL
Qty: 140 CAPSULE | Refills: 0 | Status: SHIPPED | OUTPATIENT
Start: 2024-05-14 | End: 2024-05-24 | Stop reason: SDUPTHER

## 2024-05-24 ENCOUNTER — TELEPHONE (OUTPATIENT)
Dept: PRIMARY CARE | Facility: CLINIC | Age: 59
End: 2024-05-24
Payer: COMMERCIAL

## 2024-05-24 DIAGNOSIS — E89.2 HYPOPARATHYROIDISM AFTER PROCEDURE (MULTI): ICD-10-CM

## 2024-05-24 RX ORDER — CALCITRIOL 0.25 UG/1
0.25 CAPSULE ORAL
Qty: 140 CAPSULE | Refills: 0 | Status: SHIPPED | OUTPATIENT
Start: 2024-05-24

## 2024-05-24 NOTE — TELEPHONE ENCOUNTER
Patient called 5/14 for refill of calcitriol    Has been out of this for 10 days    Was sent to incorrect pharmacy, supposed to go to [facility]

## 2024-06-11 DIAGNOSIS — E89.0 HYPOTHYROIDISM, POSTSURGICAL: ICD-10-CM

## 2024-06-11 DIAGNOSIS — E89.2 HYPOPARATHYROIDISM AFTER PROCEDURE (MULTI): ICD-10-CM

## 2024-06-12 RX ORDER — LEVOTHYROXINE SODIUM 125 UG/1
125 TABLET ORAL DAILY
Qty: 90 TABLET | Refills: 0 | Status: SHIPPED | OUTPATIENT
Start: 2024-06-12

## 2024-06-12 RX ORDER — CALCITRIOL 0.25 UG/1
CAPSULE ORAL
Refills: 0 | OUTPATIENT
Start: 2024-06-12

## 2024-06-19 NOTE — TELEPHONE ENCOUNTER
Received a [facility]ic[facility] from [facility]  Arlette had an appointment 6/19 and 5/14 and has cancelled them both.  Unfortunately, they will not accept her as a new patient since she has cancelled twice.

## 2024-06-25 NOTE — TELEPHONE ENCOUNTER
Pt called back has an apt with Terrie Altamirano  10/08/2024  [phone] fax [phone]. Please send refill

## 2024-07-09 ENCOUNTER — LAB (OUTPATIENT)
Dept: LAB | Facility: LAB | Age: 59
End: 2024-07-09
Payer: COMMERCIAL

## 2024-07-09 DIAGNOSIS — E55.9 VITAMIN D DEFICIENCY: ICD-10-CM

## 2024-07-09 DIAGNOSIS — E89.2 HYPOPARATHYROIDISM AFTER PROCEDURE (MULTI): ICD-10-CM

## 2024-07-09 DIAGNOSIS — E89.0 HYPOTHYROIDISM, POSTSURGICAL: ICD-10-CM

## 2024-07-09 DIAGNOSIS — E83.51 HYPOCALCEMIA: ICD-10-CM

## 2024-07-09 DIAGNOSIS — Z00.00 HEALTHCARE MAINTENANCE: ICD-10-CM

## 2024-07-09 DIAGNOSIS — R73.09 ELEVATED GLUCOSE: ICD-10-CM

## 2024-07-09 LAB
25(OH)D3 SERPL-MCNC: 32 NG/ML (ref 30–100)
ALBUMIN SERPL BCP-MCNC: 3.9 G/DL (ref 3.4–5)
ALP SERPL-CCNC: 42 U/L (ref 33–110)
ALT SERPL W P-5'-P-CCNC: 33 U/L (ref 7–45)
ANION GAP SERPL CALC-SCNC: 12 MMOL/L (ref 10–20)
AST SERPL W P-5'-P-CCNC: 22 U/L (ref 9–39)
BILIRUB SERPL-MCNC: 0.5 MG/DL (ref 0–1.2)
BUN SERPL-MCNC: 11 MG/DL (ref 6–23)
CA-I BLD-SCNC: 0.98 MMOL/L (ref 1.1–1.33)
CALCIUM SERPL-MCNC: 7.9 MG/DL (ref 8.6–10.6)
CHLORIDE SERPL-SCNC: 102 MMOL/L (ref 98–107)
CHOLEST SERPL-MCNC: 240 MG/DL (ref 0–199)
CHOLESTEROL/HDL RATIO: 5.4
CO2 SERPL-SCNC: 31 MMOL/L (ref 21–32)
CREAT SERPL-MCNC: 0.77 MG/DL (ref 0.5–1.05)
EGFRCR SERPLBLD CKD-EPI 2021: 90 ML/MIN/1.73M*2
ERYTHROCYTE [DISTWIDTH] IN BLOOD BY AUTOMATED COUNT: 13.4 % (ref 11.5–14.5)
EST. AVERAGE GLUCOSE BLD GHB EST-MCNC: 128 MG/DL
GLUCOSE SERPL-MCNC: 110 MG/DL (ref 74–99)
HBA1C MFR BLD: 6.1 %
HCT VFR BLD AUTO: 41.8 % (ref 36–46)
HDLC SERPL-MCNC: 44.1 MG/DL
HGB BLD-MCNC: 13.1 G/DL (ref 12–16)
LDLC SERPL CALC-MCNC: 163 MG/DL
MCH RBC QN AUTO: 28 PG (ref 26–34)
MCHC RBC AUTO-ENTMCNC: 31.3 G/DL (ref 32–36)
MCV RBC AUTO: 89 FL (ref 80–100)
NON HDL CHOLESTEROL: 196 MG/DL (ref 0–149)
NRBC BLD-RTO: 0 /100 WBCS (ref 0–0)
PHOSPHATE SERPL-MCNC: 4.7 MG/DL (ref 2.5–4.9)
PLATELET # BLD AUTO: 304 X10*3/UL (ref 150–450)
POTASSIUM SERPL-SCNC: 3.8 MMOL/L (ref 3.5–5.3)
PROT SERPL-MCNC: 6.5 G/DL (ref 6.4–8.2)
RBC # BLD AUTO: 4.68 X10*6/UL (ref 4–5.2)
SODIUM SERPL-SCNC: 141 MMOL/L (ref 136–145)
TRIGL SERPL-MCNC: 164 MG/DL (ref 0–149)
TSH SERPL-ACNC: 0.73 MIU/L (ref 0.44–3.98)
VLDL: 33 MG/DL (ref 0–40)
WBC # BLD AUTO: 5.6 X10*3/UL (ref 4.4–11.3)

## 2024-07-09 PROCEDURE — 80053 COMPREHEN METABOLIC PANEL: CPT

## 2024-07-09 PROCEDURE — 84443 ASSAY THYROID STIM HORMONE: CPT

## 2024-07-09 PROCEDURE — 36415 COLL VENOUS BLD VENIPUNCTURE: CPT

## 2024-07-09 PROCEDURE — 85027 COMPLETE CBC AUTOMATED: CPT

## 2024-07-09 PROCEDURE — 84100 ASSAY OF PHOSPHORUS: CPT

## 2024-07-09 PROCEDURE — 83036 HEMOGLOBIN GLYCOSYLATED A1C: CPT

## 2024-07-09 PROCEDURE — 82306 VITAMIN D 25 HYDROXY: CPT

## 2024-07-09 PROCEDURE — 82330 ASSAY OF CALCIUM: CPT

## 2024-07-09 PROCEDURE — 80061 LIPID PANEL: CPT

## 2024-07-11 ENCOUNTER — TELEPHONE (OUTPATIENT)
Dept: PRIMARY CARE | Facility: CLINIC | Age: 59
End: 2024-07-11
Payer: COMMERCIAL

## 2024-07-11 DIAGNOSIS — E89.0 HYPOTHYROIDISM, POSTSURGICAL: ICD-10-CM

## 2024-07-11 DIAGNOSIS — E89.2 HYPOPARATHYROIDISM AFTER PROCEDURE (MULTI): ICD-10-CM

## 2024-07-11 DIAGNOSIS — F32.A ANXIETY AND DEPRESSION: ICD-10-CM

## 2024-07-11 DIAGNOSIS — F41.9 ANXIETY AND DEPRESSION: ICD-10-CM

## 2024-07-11 RX ORDER — LEVOTHYROXINE SODIUM 125 UG/1
125 TABLET ORAL DAILY
Qty: 90 TABLET | Refills: 0 | Status: SHIPPED | OUTPATIENT
Start: 2024-07-11

## 2024-07-11 RX ORDER — FLUOXETINE HYDROCHLORIDE 20 MG/1
20 CAPSULE ORAL DAILY
Qty: 90 CAPSULE | Refills: 0 | Status: SHIPPED | OUTPATIENT
Start: 2024-07-11 | End: 2025-07-11

## 2024-07-11 RX ORDER — CALCITRIOL 0.25 UG/1
0.25 CAPSULE ORAL
Qty: 140 CAPSULE | Refills: 3 | Status: SHIPPED | OUTPATIENT
Start: 2024-07-11

## 2024-07-11 NOTE — TELEPHONE ENCOUNTER
Pt called for a med refill. Pt's medications were sent to mail away that she does not use. Pt needs a refill on the following:  Prozac 20 mg  Levothyroxine 125 mcg  Calcitriol 0.25mcg    Pt would like them sent to [facility] in [place]. Pt's next ov 10/16/24.

## 2024-07-11 NOTE — TELEPHONE ENCOUNTER
Sadie Drummond MA1 hour ago (11:28 AM)     Pt called for a med refill. Pt's medications were sent to mail away that she does not use. Pt needs a refill on the following:  Prozac 20 mg  Levothyroxine 125 mcg  Calcitriol 0.25mcg     Pt would like them sent to [facility] in [place]. Pt's next ov 10/16/24.

## 2024-07-17 DIAGNOSIS — Z12.11 COLON CANCER SCREENING: Primary | ICD-10-CM

## 2024-08-27 DIAGNOSIS — J45.909 MILD ASTHMA, UNSPECIFIED WHETHER COMPLICATED, UNSPECIFIED WHETHER PERSISTENT (HHS-HCC): ICD-10-CM

## 2024-08-27 DIAGNOSIS — F32.A ANXIETY AND DEPRESSION: ICD-10-CM

## 2024-08-27 DIAGNOSIS — R41.840 INATTENTION: ICD-10-CM

## 2024-08-27 DIAGNOSIS — F41.9 ANXIETY AND DEPRESSION: ICD-10-CM

## 2024-08-27 RX ORDER — BUPROPION HYDROCHLORIDE 150 MG/1
150 TABLET ORAL EVERY MORNING
Qty: 30 TABLET | Refills: 1 | Status: SHIPPED | OUTPATIENT
Start: 2024-08-27

## 2024-08-27 RX ORDER — MONTELUKAST SODIUM 10 MG/1
10 TABLET ORAL DAILY
Qty: 60 TABLET | Refills: 0 | Status: SHIPPED | OUTPATIENT
Start: 2024-08-27

## 2024-09-16 DIAGNOSIS — E78.5 HYPERLIPIDEMIA, UNSPECIFIED HYPERLIPIDEMIA TYPE: ICD-10-CM

## 2024-09-16 RX ORDER — ROSUVASTATIN CALCIUM 10 MG/1
10 TABLET, COATED ORAL DAILY
Qty: 30 TABLET | Refills: 3 | Status: SHIPPED | OUTPATIENT
Start: 2024-09-16 | End: 2025-10-21

## 2024-09-20 DIAGNOSIS — J45.909 MILD ASTHMA, UNSPECIFIED WHETHER COMPLICATED, UNSPECIFIED WHETHER PERSISTENT (HHS-HCC): ICD-10-CM

## 2024-09-23 RX ORDER — MOMETASONE FUROATE AND FORMOTEROL FUMARATE DIHYDRATE 200; 5 UG/1; UG/1
AEROSOL RESPIRATORY (INHALATION) 2 TIMES DAILY
Qty: 26 G | Refills: 5 | Status: SHIPPED | OUTPATIENT
Start: 2024-09-23

## 2024-10-16 ENCOUNTER — APPOINTMENT (OUTPATIENT)
Dept: PRIMARY CARE | Facility: CLINIC | Age: 59
End: 2024-10-16
Payer: COMMERCIAL

## 2024-11-15 DIAGNOSIS — J45.909 MILD ASTHMA, UNSPECIFIED WHETHER COMPLICATED, UNSPECIFIED WHETHER PERSISTENT (HHS-HCC): ICD-10-CM

## 2024-11-15 RX ORDER — ALBUTEROL SULFATE 90 UG/1
INHALANT RESPIRATORY (INHALATION)
Qty: 25.5 G | Refills: 3 | Status: SHIPPED | OUTPATIENT
Start: 2024-11-15

## 2024-11-21 NOTE — PROGRESS NOTES
utdSubjective   Patient ID: Arlette Cedeño is a 59 y.o. female who presents for Annual Exam (Pt presents for annual physical- ABN was given to pt and signed, pt verbalized understanding. Pt states no new concerns/questions at this time. ).    HPI     Patient presents today for annual physical + routine follow-up visit.    Patient concerns:    # GLP-1 for weight loss  Hx of papillary thyroid cancer s/p surgery  No family history of thyroid cancer  No prior history of pancreatitis   No issues with reflux    WELLNESS VISIT   TDAP: none found  SHINGRIX: none found  PNEUMOVAX: none found - due (hx of asthma)  PAP: none found - referral to GYN 10/2023   MAMMO: 4/2024 (sister w/ h/o breast CA)  CSCOPE: 8/2024 (hyperplastic x 2, internal hemorrhoids, repeat 5 years per GI)  DEXA: N/A  HEP C SCREEN: none found  CACS: 89 (low) in 8/2021 (aortic atherosclerosis + fatty liver + hiatal hernia)  LIPID: 7/2024 TC/HDL ratio 5.4 (, Trig 164)    Patient defers all vaccines, will get at pharmacy if doing.    Diet:  Exercise:  Alcohol use: occasionally  Smoking: never smoker    Dental:   Vision:    Cervical cancer screening: due?  Would like referral to GYN  Denies family history in first degree relative.  Denies pelvic pain, vaginal discharge, or vaginal bleeding.    Breast cancer screening: utd  Reports family history in first degree relative - sister  Denies lumps/bumps, skin changes, nipple retraction, or nipple drainage.    Colon cancer screening: utd  Denies family history in first degree relative.  Denies melena, hematochezia, constipation, diarrhea, bloating, change in bowel habits.    ROUTINE VISIT  CHRONIC CONDITIONS:   Asthma  Stable on Singulair, Dulera, and albuterol inhaler  Does need refill today, [facility] pills to [facility]  No breathing complaints at this time    Mood  Hx of anxiety and depression    Current regimen:  Prozac 40 mg daily, last increased 10/2023  Wellbutrin 150 mg  daily, started 12/2023    Reports mood doing well overall.  Tolerating current meds well, happy with regimen, wants to continue  No SI or HI.    Hyperglycemia  A1c low prediabetic range, lifestyle managed  Unable to tolerate Metformin secondary to GI upset    07/2024 fasting glucose 110, hgb A1c 6.1  04/2024 Hgb A1c 6.0  12/2023 glucose 120, hgb A1c 6.0  11/2021 glucose 103  04/2021 glucose 85, hgb A1c 5.8.    Hyperlipidemia/CAD/aortic atherosclerosis  Rosuvastatin 10 mg trial sent to pharmacy 4/2024, previously lifestyle managed.     08/2021 CACS was 89.      12/2023 TC/HDL ratio 4.5 ()  07/2024 TC/HDL ratio 5.4 (, Trig 164)    States has not been good at taking statin, was unsure when to add it in her regimen.  Was not taking when had labs done in July 2024    Patient denies any facial droop, sudden vision loss, weakness or numbness on one side of body.   Patient denies chest pain, shortness of breath with exertion, palpitations, or symptoms of claudication.     Hypothyroidism  Following with endo @ [facility] - Dr. España    Thyroid carcinoma diagnosed in 2013  S/p surgery to remove parathyroids and thyroid.   Patient presently on Levothyroxine, suppressive dose.   4/2021 Antithyroglobulin antibody negative.    7/2024 TSH normal    Last visit with endo 7/23/2024, summary per visit note:  ASSESSMENT/PLAN:  1. Post-operative hypothyroidism    Recent TSH at goal.  Continue levothyroxine 125 mcg daily.     -  US NECK (POC) ENDO USE ONLY  -  US HEAD/NECK SOFT TISSUE OTHER  -  THYROID STIMULATING HORMONE  -  T4 FREE/FREE THYROXINE  -  LEVOTHYROXINE 125 MCG TABLET     2. 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  Did not require I-131 treatment.     I performed neck ultrasound in the office today.  The neck was examined from the sternum to the mandible.  Tiny thyroid remnant noted bilaterally.  No neck masses.  No suspicious lymph nodes found  in neck levels VI, IV, III, IIa, IIb (right and left neck).     I reviewed the images with pt.    -  US NECK (POC) ENDO USE ONLY  -  US HEAD/NECK SOFT TISSUE OTHER  -  THYROGLOBULIN, SERUM WITH REFLEX TO IA OR LC-MS/MS     3. Hypocalcemia     Post-procedural hypoparathyroidism with occasional symptoms of hypocalcemia.  Calcium in the upper 7's.  Discussed with pt that calcitriol is not calcium - it is a form of vitamin D.  She is on a rather high dose of this.  I suspect she will need less calcitriol and more calcium.  It is unclear her exact calcium/mag combo supplement and will bring bottle to next appt.  Goal calcium level will be low normal to slightly low.  Decrease calcitriol to 0.25 mcg 4 tablets daily.  Take extra calcium pill with dinner so dosing will be 1 calcium/mag pill with dinner, 2 pills at bedtime.   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     -  PTH INTACT  -  COMPREHENSIVE METABOLIC PANEL  -  VITAMIN D 25 HYDROXY  -  MAGNESIUM     4. Postprocedural hypoparathyroidism    -  PTH INTACT  -  CALCITRIOL 0.25 MCG CAPSULE     5. Obesity, Class II, BMI 35-39.9  Work on dietary modification and increased physical activity to promote weight loss.  She had questions about pre-diabetes - gave her informational booklet - limit carbs to 45 grams per meal.     6. Vitamin D deficiency     -  PTH INTACT  -  VITAMIN D 25 HYDROXY     Follow-up in 3 months with labs done before visit.    Hypoparathyroidism, post-op  s/p surgery to remove parathyroids and thyroid in 2013 2/2 thyroid carcinoma  Taking Calcitriol 0.25 mcg 4 tablets daily + Calcium Acetate 667 mg 2 tablets daily.    Following with endo @ [facility] - Dr. España    Hypocalcemia  Following with endo @ [facility] - Dr. España  On Calcium Acetate 667 mg, 3 tablets daily.     07/2024 Calcium low at 7.0, vit D nml at 32. Ionized calcium low (0.98, normal 1.10).  12/2023 Calcium low at 7.4, vit D low at 22  11/2021  Calcium low at 8.3 (low normal 8.6). Ionized calcium low (1.05, normal 1.10).  04/2021 Calcium low at 8.1 (low normal 8.6). Ionized calcium low (1.00, normal 1.10).    Lung nodule  3-4 mm lateral right, incidental finding on 8/2021 CACS.   Because this is less than 6 mm no follow-up necessary per Fleischner Criteria.   Patient non-smoker but would like to have repeat CT done in 6 months due to family history (sister with hx of breast cancer spread to lymph nodes) and personal history of thyroid carcinoma.     11/2021 CT Chest showed stable LLL nodule but new RLL nodule not in field of view on prior exam. Radiology recommended considering 2 year follow-up from initial CT cardiac scoring exam to demonstrate stability.      Repeat due 2023, CT chest ordered 10/2023 - NOT COMPLETED    Review of Systems   All other systems reviewed and are negative.      Objective   /72 (BP Location: Left arm, Patient Position: Sitting, BP Cuff Size: Large adult)   Pulse 82   Temp 36.7 °C (98 °F) (Temporal)   Wt 105 kg (231 lb 8 oz)   SpO2 97%   BMI 39.74 kg/m²     Physical Exam  Vitals and nursing note reviewed.   Constitutional:       General: She is not in acute distress.     Appearance: Normal appearance. She is not toxic-appearing.   HENT:      Head: Normocephalic and atraumatic.      Right Ear: Tympanic membrane normal.      Left Ear: Tympanic membrane normal.      Nose: Rhinorrhea present. Rhinorrhea is clear.      Right Turbinates: Swollen and pale.      Left Turbinates: Swollen and pale.      Mouth/Throat:      Lips: Pink.      Pharynx: Oropharynx is clear. No posterior oropharyngeal erythema.   Cardiovascular:      Rate and Rhythm: Normal rate and regular rhythm.      Heart sounds: No murmur heard.     No friction rub. No gallop.   Pulmonary:      Effort: Pulmonary effort is normal.      Breath sounds: Normal breath sounds. No wheezing, rhonchi or rales.   Musculoskeletal:      Right lower leg: No edema.      Left  lower leg: No edema.   Neurological:      General: No focal deficit present.      Mental Status: She is alert and oriented to person, place, and time.   Psychiatric:         Mood and Affect: Mood normal.         Behavior: Behavior normal.         Assessment/Plan   Problem List Items Addressed This Visit             ICD-10-CM    Anxiety and depression F41.9, F32.A     Doing well per patient report.    Continue Prozac 40 mg daily.    Continue Wellbutrin 150 mg daily, recommend you take this when you first break your fast in the AM. I would avoid taking this too late in the day as this can cause some insomnia.    Will check scales upon rooming at next visit.         Relevant Medications    buPROPion XL (Wellbutrin XL) 150 mg 24 hr tablet    FLUoxetine (PROzac) 20 mg capsule    Asthma J45.909     Stable, doing well on current regimen.  She will continue Singulair, Dulera, and albuterol as prescribed.  Refills provided today.         Relevant Medications    montelukast (Singulair) 10 mg tablet    mometasone-formoterol (Dulera) 200-5 mcg/actuation inhaler    albuterol 90 mcg/actuation inhaler    Hyperlipidemia E78.5     08/2021 CACS was 89.     12/2023 TC/HDL ratio 4.5 () (off statin)  07/2024 TC/HDL ratio 5.4 (, Trig 164) (off statin)  Goal TC/HDL ratio 3.4 or less, LDL 99 or less,  or less, and TRIG 150 or less.     Reports was not on statin at time of last lab draw, unsure when to take in her regimen.  Advised fine for her to take with nighttime pills.    Repeat lipid panel to be done after taking for atleast 8-12 weeks consistently, can do before next follow-up.    To take Rosuvastatin 10 mg daily for CAD and elevated cholesterol.  Refills provided today.    Diet and exercise recommendations revisited.     To lower this with lifestyle measures:  -make sure you are avoiding refined carbs such as breads, pasta, cereal, candy, soda,  nutrition bars, granola, chips, and sugar sweetened beverages.       -eat 5- 7 servings daily of veggies,  healthy protein such as chicken, fish,  beans, and eggs, and include healthy fats in your diet such as seeds, nuts, olive oil, avocados, and salmon.   -exercise 4 - 6 days per week as you are able, 150 minutes total weekly divided up is recommended with 3-4 of those days including resistance/strength training.  -consider taking the fiber product psyllium capsules or powder 2 times daily (generic brand is fine) , or a brand name psyllium such as [place] [facility] or Metamucil.  -consider also adding plant stanols and sterols such as Nature Made CholestOff, available over the counter.          Relevant Medications    rosuvastatin (Crestor) 10 mg tablet    tirzepatide, weight loss, (Zepbound) 2.5 mg/0.5 mL injection    Hypothyroidism, postsurgical E89.0     Post-surgical hypothyroidism due to thyroid carcinoma diagnosed in 2013  S/p surgery to remove parathyroids and thyroid.   Patient presently on Levothyroxine, suppressive dose.     Following with endo @ [facility] - Dr. Patria Hernandez, continue present regimen as per endo.  Endo managing labs, med recs.          Relevant Medications    levothyroxine (Synthroid, Levoxyl) 125 mcg tablet    Elevated glucose R73.09     07/2024 fasting glucose 110, hgb A1c 6.1  04/2024 Hgb A1c 6.0  12/2023 glucose 120, hgb A1c 6.0  11/2021 glucose 103  04/2021 glucose 85, hgb A1c 5.8.    A1c remains stable in low prediabetic range  Previously unable to tolerate Metformin secondary to GI upset.  Will send in Zepbound for elevated BMI w/ serious comorbidities (CAD, HLD, asthma, prediabetes)         Hypoparathyroidism after procedure (Multi) E89.2     s/p surgery to remove parathyroids and thyroid in 2013 2/2 thyroid carcinoma     Patient to [facility] and follow-up as per endo.         Mild CAD I25.10     See HLD a/p section         Relevant Medications    tirzepatide, weight loss, (Zepbound) 2.5 mg/0.5 mL injection    S/P total parathyroidectomy (CMS/HCC)  E89.2     s/p surgery to remove parathyroids and thyroid in 2013 2/2 thyroid carcinoma    Patient to [facility] and follow-up as per endo.         Healthcare maintenance - Primary Z00.00     Vaccines and screenings reviewed.  Questionnaires completed.  Health and wellness topics reviewed.  Diet and exercise recommendations revisited.  Routine blood work reviewed today. Has upcoming labs for endo.    VACCINES:  Below vaccines are recommended, can be administered at pharmacy,no rx needed.  -Flu vaccine recommended in the fall   -TDAP is recommended every 10 years. Due per our records.  -Prevnar-20 recommended in ages before 65 due to your history of asthma  -Shingles vaccine (Shingrix) is recommended. Please call insurance to see if covered. This vaccine is expensive but extremely effective. This is to be administered in 2 separate doses, 2- 6 months apart. This is a killed virus vaccine, so no risk of chicken pox or shingles with administration. The vaccine is approximately 90 % effective to protect against shingles even 5 years out. Side effects of the vaccine can include soreness of the arm at administration site and possible flu-like symptoms after administration. This is a strongly recommended vaccine.     SCREENINGS:  -Screening pap is due, referral to GYN placed today at patient request.  -Screening mammogram is utd, normal 4/2024, repeat annually  -Screening colonoscopy is utd, completed 8/2024, repeat in 5 years per GI  -Screening DEXA recommended starting at 65 for women of general population, however, due to history of hyperparathyroidism would defer dexa recommendations to endo. Patient aware.    LIFESTYLE MEASURES  -consider increasing protein intake provided no issues with kidneys to 1 gram per 1 pound of ideal body weight per not to exceed 150 gram per day. May have to supplement with a protein powder to achieve this goal.  -make sure you are avoiding refined carbs such as breads, pasta, cereal, candy, soda,   nutrition bars, granola, chips, and sugar sweetened beverages.      -eat 5- 7 servings daily of veggies,  healthy protein such as chicken, fish,  beans, and eggs, and include healthy fats in your diet such as seeds, nuts, olive oil, avocados, and salmon.   -exercise 4 - 6 days per week as you are able, 150 minutes total weekly divided up is recommended with 3-4 of those days including resistance/strength training.  -Vitamin D is recommended at 1000 - 5000 IU international units daily.   -Always wear sunscreen when you have sun exposure.  -64 oz of water is recommended daily.  -Dental visits recommended every 6 months.  -Eye exam recommended every 2 years, for those with vision problems every year.           Inattention R41.840     Doing well on Wellbutrin 150 mg daily, continue, refilled today.  Can revisit at follow-up if needs arise.         Relevant Medications    buPROPion XL (Wellbutrin XL) 150 mg 24 hr tablet    Vitamin D deficiency E55.9     Hx of vitamin D deficiency, most recent vitamin D level 32 in July 2024, low normal range.  Continue prescription strength vitamin D, refilled today.         Relevant Medications    ergocalciferol (Vitamin D-2) 1.25 MG (57705 UT) capsule (Start on 11/24/2024)    Protracted URI J06.9     You have been diagnosed with an upper respiratory tract infection (URI), which is an inflammation/infection of the upper respiratory tract /lungs.  These are almost viral in nature, and therefore treatment is management of symptoms. Treatment with an antibiotic for typical URI does not help, and can cause harm from side effects of medications and bacterial resistance. Will treat symptomatically as noted below, however, prescription for antibiotic has been provided incase symptoms do not improve in the next 7-10 days patient can fill this.     #Antibiotic rx provided - Azithromycin     #Other non-prescriptions measures as discussed below.     To minimize these symptoms, we look to treat the  underlying cause of poor ventilation. Swelling related to allergies or inflammation can sometimes be helped by nasal steroid sprays such as Flonase or Nasacort over time (7 - 10 days)      If secretions are THICK:  Drink at least 6-8 glasses of water daily to thin secretions.  Mucinex can be used if secretions remain thick.     If secretions are THIN, watery, and abundant:  Antihistamine such as loratadine (claritin) can help dry up a drippy nose.       A teaspoon of honey every 4 hours as needed for cough has been shown to reduce cough as well or better than over-the-counter cough suppressants. Cough drops can also be helpful.      Gargling with warm salt water can help clear post nasal drip and soothe a sore throat.  Throat lozenges can also be helpful.       Fever or aches can be helped by taking acetaminophen (Tylenol) every four hours as needed, or ibuprofen (Motrin, Advil) or naproxen (Aleve) as directed if you are able.      Other options to help open up nasal passages and Eustachian tubes can include non-medicine intervention such as steam, essential oils such as Eucalyptus, peppermint, rosemary added to a diffuser or humidifier.     Mobilizing fluid and helping with congestion through repetitive exercise such as rebounding on a mini- trampoline, jumping jacks, or running may all help.      URIs usually improves within 2 weeks, but at times the cough will persist for three  or four weeks beyond that. If you are experiencing any shortness of breath, developing wheezing, or getting worse rather than better after the above measures, and call the office for further evaluation.           Relevant Medications    azithromycin (Zithromax) 250 mg tablet    Eczema of both external ears H60.543     Rx for triamcinolone cream provided today at patient request.         Relevant Medications    triamcinolone (Kenalog) 0.1 % cream    Class 2 severe obesity with serious comorbidity and body mass index (BMI) of 39.0 to 39.9  in adult E66.812, E66.01, Z68.39     Medication assisted weight loss discussed with patient today including GLP-1 injectables. Given patient's BMI in 39 range and serious comorbidities including asthma, CAD/HLD, prediabetes, and hypothyroidism s/p parathyroidectomy I do feel she would be good candidate for Zepbound for medication assisted weight loss. Patient advised these often require prior authorizations via insurance thus will enlist help of clinical pharmacist with .     At this time, I have sent in Zepbound.  R/B discussion held, side effects reviewed.  Side effects include GI upset, nausea, diarrhea, constipation.  This injection is given once weekly, titrated up monthly.     [facility] pharmacy referral has been placed today for review of medication assisted weight loss.  It will be between 2-4 weeks for patient to be contacted by a member of the clinical pharmacy.          Relevant Medications    tirzepatide, weight loss, (Zepbound) 2.5 mg/0.5 mL injection     Other Visit Diagnoses         Codes    Screening for cervical cancer     Z12.4    Relevant Orders    Referral to Gynecology            Follow-up in 6 months for routine care.  Call for sooner follow-up if needed.       Scribe Attestation  By signing my name below, ILeyla Scribe   attest that this documentation has been prepared under the direction and in the presence of Meghan Holland DO.

## 2024-11-22 ENCOUNTER — APPOINTMENT (OUTPATIENT)
Dept: PRIMARY CARE | Facility: CLINIC | Age: 59
End: 2024-11-22
Payer: COMMERCIAL

## 2024-11-22 VITALS
OXYGEN SATURATION: 97 % | HEART RATE: 82 BPM | TEMPERATURE: 98 F | BODY MASS INDEX: 39.74 KG/M2 | DIASTOLIC BLOOD PRESSURE: 72 MMHG | WEIGHT: 231.5 LBS | SYSTOLIC BLOOD PRESSURE: 116 MMHG

## 2024-11-22 DIAGNOSIS — E55.9 VITAMIN D DEFICIENCY: ICD-10-CM

## 2024-11-22 DIAGNOSIS — E89.2 HYPOPARATHYROIDISM AFTER PROCEDURE (MULTI): ICD-10-CM

## 2024-11-22 DIAGNOSIS — F32.A ANXIETY AND DEPRESSION: ICD-10-CM

## 2024-11-22 DIAGNOSIS — R41.840 INATTENTION: ICD-10-CM

## 2024-11-22 DIAGNOSIS — H60.543 ECZEMA OF BOTH EXTERNAL EARS: ICD-10-CM

## 2024-11-22 DIAGNOSIS — E78.5 HYPERLIPIDEMIA, UNSPECIFIED HYPERLIPIDEMIA TYPE: ICD-10-CM

## 2024-11-22 DIAGNOSIS — E89.2 S/P TOTAL PARATHYROIDECTOMY (CMS/HCC): ICD-10-CM

## 2024-11-22 DIAGNOSIS — Z12.4 SCREENING FOR CERVICAL CANCER: ICD-10-CM

## 2024-11-22 DIAGNOSIS — I25.10 MILD CAD: ICD-10-CM

## 2024-11-22 DIAGNOSIS — Z00.00 HEALTHCARE MAINTENANCE: Primary | ICD-10-CM

## 2024-11-22 DIAGNOSIS — J45.909 MILD ASTHMA, UNSPECIFIED WHETHER COMPLICATED, UNSPECIFIED WHETHER PERSISTENT (HHS-HCC): ICD-10-CM

## 2024-11-22 DIAGNOSIS — F41.9 ANXIETY AND DEPRESSION: ICD-10-CM

## 2024-11-22 DIAGNOSIS — E66.01 CLASS 2 SEVERE OBESITY WITH SERIOUS COMORBIDITY AND BODY MASS INDEX (BMI) OF 39.0 TO 39.9 IN ADULT, UNSPECIFIED OBESITY TYPE: ICD-10-CM

## 2024-11-22 DIAGNOSIS — J06.9 PROTRACTED URI: ICD-10-CM

## 2024-11-22 DIAGNOSIS — E89.0 HYPOTHYROIDISM, POSTSURGICAL: ICD-10-CM

## 2024-11-22 DIAGNOSIS — R73.09 ELEVATED GLUCOSE: ICD-10-CM

## 2024-11-22 DIAGNOSIS — E66.812 CLASS 2 SEVERE OBESITY WITH SERIOUS COMORBIDITY AND BODY MASS INDEX (BMI) OF 39.0 TO 39.9 IN ADULT, UNSPECIFIED OBESITY TYPE: ICD-10-CM

## 2024-11-22 PROBLEM — L91.8 OTHER HYPERTROPHIC DISORDERS OF THE SKIN: Status: RESOLVED | Noted: 2021-07-06 | Resolved: 2024-11-22

## 2024-11-22 RX ORDER — MOMETASONE FUROATE AND FORMOTEROL FUMARATE DIHYDRATE 200; 5 UG/1; UG/1
2 AEROSOL RESPIRATORY (INHALATION) 2 TIMES DAILY
Qty: 26 G | Refills: 5 | Status: SHIPPED | OUTPATIENT
Start: 2024-11-22 | End: 2024-11-22

## 2024-11-22 RX ORDER — AZITHROMYCIN 250 MG/1
TABLET, FILM COATED ORAL
Qty: 6 TABLET | Refills: 0 | Status: SHIPPED | OUTPATIENT
Start: 2024-11-22 | End: 2024-11-26 | Stop reason: SDUPTHER

## 2024-11-22 RX ORDER — MONTELUKAST SODIUM 10 MG/1
10 TABLET ORAL DAILY
Qty: 90 TABLET | Refills: 3 | Status: SHIPPED | OUTPATIENT
Start: 2024-11-22

## 2024-11-22 RX ORDER — ALBUTEROL SULFATE 90 UG/1
INHALANT RESPIRATORY (INHALATION)
Qty: 25.5 G | Refills: 3 | Status: SHIPPED | OUTPATIENT
Start: 2024-11-22

## 2024-11-22 RX ORDER — LEVOTHYROXINE SODIUM 125 UG/1
125 TABLET ORAL DAILY
Qty: 90 TABLET | Refills: 3 | Status: SHIPPED | OUTPATIENT
Start: 2024-11-22

## 2024-11-22 RX ORDER — ALBUTEROL SULFATE 90 UG/1
INHALANT RESPIRATORY (INHALATION)
Qty: 25.5 G | Refills: 3 | Status: SHIPPED | OUTPATIENT
Start: 2024-11-22 | End: 2024-11-22

## 2024-11-22 RX ORDER — BUPROPION HYDROCHLORIDE 150 MG/1
150 TABLET ORAL EVERY MORNING
Qty: 90 TABLET | Refills: 3 | Status: SHIPPED | OUTPATIENT
Start: 2024-11-22

## 2024-11-22 RX ORDER — FLUOXETINE HYDROCHLORIDE 20 MG/1
20 CAPSULE ORAL DAILY
Qty: 90 CAPSULE | Refills: 3 | Status: SHIPPED | OUTPATIENT
Start: 2024-11-22 | End: 2025-11-22

## 2024-11-22 RX ORDER — MOMETASONE FUROATE AND FORMOTEROL FUMARATE DIHYDRATE 200; 5 UG/1; UG/1
2 AEROSOL RESPIRATORY (INHALATION) 2 TIMES DAILY
Qty: 26 G | Refills: 5 | Status: SHIPPED | OUTPATIENT
Start: 2024-11-22

## 2024-11-22 RX ORDER — ERGOCALCIFEROL 1.25 MG/1
1.25 CAPSULE ORAL
Qty: 12 CAPSULE | Refills: 1 | Status: SHIPPED | OUTPATIENT
Start: 2024-11-24

## 2024-11-22 RX ORDER — ROSUVASTATIN CALCIUM 10 MG/1
10 TABLET, COATED ORAL DAILY
Qty: 90 TABLET | Refills: 3 | Status: SHIPPED | OUTPATIENT
Start: 2024-11-22 | End: 2025-12-27

## 2024-11-22 RX ORDER — TRIAMCINOLONE ACETONIDE 1 MG/G
1 CREAM TOPICAL 2 TIMES DAILY
Qty: 45 G | Refills: 0 | Status: SHIPPED | OUTPATIENT
Start: 2024-11-22

## 2024-11-22 ASSESSMENT — PATIENT HEALTH QUESTIONNAIRE - PHQ9
7. TROUBLE CONCENTRATING ON THINGS, SUCH AS READING THE NEWSPAPER OR WATCHING TELEVISION: NEARLY EVERY DAY
SUM OF ALL RESPONSES TO PHQ9 QUESTIONS 1 AND 2: 6
5. POOR APPETITE OR OVEREATING: NEARLY EVERY DAY
1. LITTLE INTEREST OR PLEASURE IN DOING THINGS: NEARLY EVERY DAY
9. THOUGHTS THAT YOU WOULD BE BETTER OFF DEAD, OR OF HURTING YOURSELF: NEARLY EVERY DAY
3. TROUBLE FALLING OR STAYING ASLEEP OR SLEEPING TOO MUCH: NEARLY EVERY DAY
4. FEELING TIRED OR HAVING LITTLE ENERGY: NEARLY EVERY DAY
SUM OF ALL RESPONSES TO PHQ QUESTIONS 1-9: 27
6. FEELING BAD ABOUT YOURSELF - OR THAT YOU ARE A FAILURE OR HAVE LET YOURSELF OR YOUR FAMILY DOWN: NEARLY EVERY DAY
2. FEELING DOWN, DEPRESSED OR HOPELESS: NEARLY EVERY DAY
8. MOVING OR SPEAKING SO SLOWLY THAT OTHER PEOPLE COULD HAVE NOTICED. OR THE OPPOSITE, BEING SO FIGETY OR RESTLESS THAT YOU HAVE BEEN MOVING AROUND A LOT MORE THAN USUAL: NEARLY EVERY DAY

## 2024-11-22 ASSESSMENT — ANXIETY QUESTIONNAIRES
6. BECOMING EASILY ANNOYED OR IRRITABLE: MORE THAN HALF THE DAYS
1. FEELING NERVOUS, ANXIOUS, OR ON EDGE: SEVERAL DAYS
7. FEELING AFRAID AS IF SOMETHING AWFUL MIGHT HAPPEN: NEARLY EVERY DAY
3. WORRYING TOO MUCH ABOUT DIFFERENT THINGS: NEARLY EVERY DAY
2. NOT BEING ABLE TO STOP OR CONTROL WORRYING: NEARLY EVERY DAY
5. BEING SO RESTLESS THAT IT IS HARD TO SIT STILL: MORE THAN HALF THE DAYS
GAD7 TOTAL SCORE: 17
4. TROUBLE RELAXING: NEARLY EVERY DAY

## 2024-11-22 NOTE — ASSESSMENT & PLAN NOTE
Vaccines and screenings reviewed.  Questionnaires completed.  Health and wellness topics reviewed.  Diet and exercise recommendations revisited.  Routine blood work reviewed today. Has upcoming labs for endo.    VACCINES:  Below vaccines are recommended, can be administered at pharmacy,no rx needed.  -Flu vaccine recommended in the fall   -TDAP is recommended every 10 years. Due per our records.  -Prevnar-20 recommended in ages before 65 due to your history of asthma  -Shingles vaccine (Shingrix) is recommended. Please call insurance to see if covered. This vaccine is expensive but extremely effective. This is to be administered in 2 separate doses, 2- 6 months apart. This is a killed virus vaccine, so no risk of chicken pox or shingles with administration. The vaccine is approximately 90 % effective to protect against shingles even 5 years out. Side effects of the vaccine can include soreness of the arm at administration site and possible flu-like symptoms after administration. This is a strongly recommended vaccine.     SCREENINGS:  -Screening pap is due, referral to GYN placed today at patient request.  -Screening mammogram is utd, normal 4/2024, repeat annually  -Screening colonoscopy is utd, completed 8/2024, repeat in 5 years per GI  -Screening DEXA recommended starting at 65 for women of general population, however, due to history of hyperparathyroidism would defer dexa recommendations to endo. Patient aware.    LIFESTYLE MEASURES  -consider increasing protein intake provided no issues with kidneys to 1 gram per 1 pound of ideal body weight per not to exceed 150 gram per day. May have to supplement with a protein powder to achieve this goal.  -make sure you are avoiding refined carbs such as breads, pasta, cereal, candy, soda,  nutrition bars, granola, chips, and sugar sweetened beverages.      -eat 5- 7 servings daily of veggies,  healthy protein such as chicken, fish,  beans, and eggs, and include healthy  fats in your diet such as seeds, nuts, olive oil, avocados, and salmon.   -exercise 4 - 6 days per week as you are able, 150 minutes total weekly divided up is recommended with 3-4 of those days including resistance/strength training.  -Vitamin D is recommended at 1000 - 5000 IU international units daily.   -Always wear sunscreen when you have sun exposure.  -64 oz of water is recommended daily.  -Dental visits recommended every 6 months.  -Eye exam recommended every 2 years, for those with vision problems every year.

## 2024-11-22 NOTE — ASSESSMENT & PLAN NOTE
Doing well on Wellbutrin 150 mg daily, continue, refilled today.  Can revisit at follow-up if needs arise.

## 2024-11-22 NOTE — ASSESSMENT & PLAN NOTE
08/2021 CACS was 89.     12/2023 TC/HDL ratio 4.5 () (off statin)  07/2024 TC/HDL ratio 5.4 (, Trig 164) (off statin)  Goal TC/HDL ratio 3.4 or less, LDL 99 or less,  or less, and TRIG 150 or less.     Reports was not on statin at time of last lab draw, unsure when to take in her regimen.  Advised fine for her to take with nighttime pills.    Repeat lipid panel to be done after taking for atleast 8-12 weeks consistently, can do before next follow-up.    To take Rosuvastatin 10 mg daily for CAD and elevated cholesterol.  Refills provided today.    Diet and exercise recommendations revisited.     To lower this with lifestyle measures:  -make sure you are avoiding refined carbs such as breads, pasta, cereal, candy, soda,  nutrition bars, granola, chips, and sugar sweetened beverages.      -eat 5- 7 servings daily of veggies,  healthy protein such as chicken, fish,  beans, and eggs, and include healthy fats in your diet such as seeds, nuts, olive oil, avocados, and salmon.   -exercise 4 - 6 days per week as you are able, 150 minutes total weekly divided up is recommended with 3-4 of those days including resistance/strength training.  -consider taking the fiber product psyllium capsules or powder 2 times daily (generic brand is fine) , or a brand name psyllium such as [place] [facility] or Metamucil.  -consider also adding plant stanols and sterols such as Nature Made CholestOff, available over the counter.

## 2024-11-22 NOTE — PATIENT INSTRUCTIONS
Healthcare maintenance  Vaccines and screenings reviewed.  Questionnaires completed.  Health and wellness topics reviewed.  Diet and exercise recommendations revisited.  Routine blood work reviewed today. Has upcoming labs for endo.    VACCINES:  Below vaccines are recommended, can be administered at pharmacy,no rx needed.  -Flu vaccine recommended in the fall   -TDAP is recommended every 10 years. Due per our records.  -Prevnar-20 recommended in ages before 65 due to your history of asthma  -Shingles vaccine (Shingrix) is recommended. Please call insurance to see if covered. This vaccine is expensive but extremely effective. This is to be administered in 2 separate doses, 2- 6 months apart. This is a killed virus vaccine, so no risk of chicken pox or shingles with administration. The vaccine is approximately 90 % effective to protect against shingles even 5 years out. Side effects of the vaccine can include soreness of the arm at administration site and possible flu-like symptoms after administration. This is a strongly recommended vaccine.     SCREENINGS:  -Screening pap is due, referral to GYN placed today at patient request.  -Screening mammogram is utd, normal 4/2024, repeat annually  -Screening colonoscopy is utd, completed 8/2024, repeat in 5 years per GI  -Screening DEXA recommended starting at 65 for women of general population, however, due to history of hyperparathyroidism would defer dexa recommendations to endo. Patient aware.    LIFESTYLE MEASURES  -consider increasing protein intake provided no issues with kidneys to 1 gram per 1 pound of ideal body weight per not to exceed 150 gram per day. May have to supplement with a protein powder to achieve this goal.  -make sure you are avoiding refined carbs such as breads, pasta, cereal, candy, soda,  nutrition bars, granola, chips, and sugar sweetened beverages.      -eat 5- 7 servings daily of veggies,  healthy protein such as chicken, fish,  beans, and  eggs, and include healthy fats in your diet such as seeds, nuts, olive oil, avocados, and salmon.   -exercise 4 - 6 days per week as you are able, 150 minutes total weekly divided up is recommended with 3-4 of those days including resistance/strength training.  -Vitamin D is recommended at 1000 - 5000 IU international units daily.   -Always wear sunscreen when you have sun exposure.  -64 oz of water is recommended daily.  -Dental visits recommended every 6 months.  -Eye exam recommended every 2 years, for those with vision problems every year.      Anxiety and depression  Doing well per patient report.    Continue Prozac 40 mg daily.    Continue Wellbutrin 150 mg daily, recommend you take this when you first break your fast in the AM. I would avoid taking this too late in the day as this can cause some insomnia.    Will check scales upon rooming at next visit.    Elevated glucose  07/2024 fasting glucose 110, hgb A1c 6.1  04/2024 Hgb A1c 6.0  12/2023 glucose 120, hgb A1c 6.0  11/2021 glucose 103  04/2021 glucose 85, hgb A1c 5.8.    A1c remains stable in low prediabetic range  Previously unable to tolerate Metformin secondary to GI upset.  Will send in Zepbound for elevated BMI w/ serious comorbidities (CAD, HLD, asthma, prediabetes)    Asthma ([facility])  Stable, doing well on current regimen.  She will continue Singulair, Dulera, and albuterol as prescribed.  Refills provided today.    Hyperlipidemia  08/2021 CACS was 89.     12/2023 TC/HDL ratio 4.5 () (off statin)  07/2024 TC/HDL ratio 5.4 (, Trig 164) (off statin)  Goal TC/HDL ratio 3.4 or less, LDL 99 or less,  or less, and TRIG 150 or less.     Reports was not on statin at time of last lab draw, unsure when to take in her regimen.  Advised fine for her to take with nighttime pills.    Repeat lipid panel to be done after taking for atleast 8-12 weeks consistently, can do before next follow-up.    To take Rosuvastatin 10 mg daily for CAD and  elevated cholesterol.  Refills provided today.    Diet and exercise recommendations revisited.     To lower this with lifestyle measures:  -make sure you are avoiding refined carbs such as breads, pasta, cereal, candy, soda,  nutrition bars, granola, chips, and sugar sweetened beverages.      -eat 5- 7 servings daily of veggies,  healthy protein such as chicken, fish,  beans, and eggs, and include healthy fats in your diet such as seeds, nuts, olive oil, avocados, and salmon.   -exercise 4 - 6 days per week as you are able, 150 minutes total weekly divided up is recommended with 3-4 of those days including resistance/strength training.  -consider taking the fiber product psyllium capsules or powder 2 times daily (generic brand is fine) , or a brand name psyllium such as [employer] or Metamucil.  -consider also adding plant stanols and sterols such as Nature Made CholestOff, available over the counter.     Mild CAD  See HLD a/p section    S/P total parathyroidectomy (CMS/HCC)  s/p surgery to remove parathyroids and thyroid in 2013 2/2 thyroid carcinoma    Patient to [facility] and follow-up as per endo.    Hypoparathyroidism after procedure (Multi)  s/p surgery to remove parathyroids and thyroid in 2013 2/2 thyroid carcinoma     Patient to [facility] and follow-up as per endo.    Hypothyroidism, postsurgical  Post-surgical hypothyroidism due to thyroid carcinoma diagnosed in 2013  S/p surgery to remove parathyroids and thyroid.   Patient presently on Levothyroxine, suppressive dose.     Following with endo @ [facility] - Dr. Patria Hernandez, continue present regimen as per hunter.  Hunter managing labs, med recs.     Vitamin D deficiency  Hx of vitamin D deficiency, most recent vitamin D level 32 in July 2024, low normal range.  Continue prescription strength vitamin D, refilled today.    Inattention  Doing well on Wellbutrin 150 mg daily, continue, refilled today.  Can revisit at follow-up if needs arise.    Eczema of both  external ears  Rx for triamcinolone cream provided today at patient request.    Protracted URI  You have been diagnosed with an upper respiratory tract infection (URI), which is an inflammation/infection of the upper respiratory tract /lungs.  These are almost viral in nature, and therefore treatment is management of symptoms. Treatment with an antibiotic for typical URI does not help, and can cause harm from side effects of medications and bacterial resistance. Will treat symptomatically as noted below, however, prescription for antibiotic has been provided incase symptoms do not improve in the next 7-10 days patient can fill this.     #Antibiotic rx provided - Azithromycin     #Other non-prescriptions measures as discussed below.     To minimize these symptoms, we look to treat the underlying cause of poor ventilation. Swelling related to allergies or inflammation can sometimes be helped by nasal steroid sprays such as Flonase or Nasacort over time (7 - 10 days)      If secretions are THICK:  Drink at least 6-8 glasses of water daily to thin secretions.  Mucinex can be used if secretions remain thick.     If secretions are THIN, watery, and abundant:  Antihistamine such as loratadine (claritin) can help dry up a drippy nose.       A teaspoon of honey every 4 hours as needed for cough has been shown to reduce cough as well or better than over-the-counter cough suppressants. Cough drops can also be helpful.      Gargling with warm salt water can help clear post nasal drip and soothe a sore throat.  Throat lozenges can also be helpful.       Fever or aches can be helped by taking acetaminophen (Tylenol) every four hours as needed, or ibuprofen (Motrin, Advil) or naproxen (Aleve) as directed if you are able.      Other options to help open up nasal passages and Eustachian tubes can include non-medicine intervention such as steam, essential oils such as Eucalyptus, peppermint, rosemary added to a diffuser or  humidifier.     Mobilizing fluid and helping with congestion through repetitive exercise such as rebounding on a mini- trampoline, jumping jacks, or running may all help.      URIs usually improves within 2 weeks, but at times the cough will persist for three  or four weeks beyond that. If you are experiencing any shortness of breath, developing wheezing, or getting worse rather than better after the above measures, and call the office for further evaluation.      Due for vaccines:     Tdap,  shingrix,  prevnar 20 due to asthma

## 2024-11-22 NOTE — ASSESSMENT & PLAN NOTE
Post-surgical hypothyroidism due to thyroid carcinoma diagnosed in 2013  S/p surgery to remove parathyroids and thyroid.   Patient presently on Levothyroxine, suppressive dose.     Following with endo @ [facility] - Dr. Patria Hernandez, continue present regimen as per endo.  Endo managing labs, med recs.

## 2024-11-22 NOTE — ASSESSMENT & PLAN NOTE
07/2024 fasting glucose 110, hgb A1c 6.1  04/2024 Hgb A1c 6.0  12/2023 glucose 120, hgb A1c 6.0  11/2021 glucose 103  04/2021 glucose 85, hgb A1c 5.8.    A1c remains stable in low prediabetic range  Previously unable to tolerate Metformin secondary to GI upset.  Will send in Zepbound for elevated BMI w/ serious comorbidities (CAD, HLD, asthma, prediabetes)

## 2024-11-22 NOTE — ASSESSMENT & PLAN NOTE
Medication assisted weight loss discussed with patient today including GLP-1 injectables. Given patient's BMI in 39 range and serious comorbidities including asthma, CAD/HLD, prediabetes, and hypothyroidism s/p parathyroidectomy I do feel she would be good candidate for Zepbound for medication assisted weight loss. Patient advised these often require prior authorizations via insurance thus will enlist help of clinical pharmacist with .     At this time, I have sent in Zepbound.  R/B discussion held, side effects reviewed.  Side effects include GI upset, nausea, diarrhea, constipation.  This injection is given once weekly, titrated up monthly.     [facility] pharmacy referral has been placed today for review of medication assisted weight loss.  It will be between 2-4 weeks for patient to be contacted by a member of the clinical pharmacy.

## 2024-11-22 NOTE — ASSESSMENT & PLAN NOTE
Hx of vitamin D deficiency, most recent vitamin D level 32 in July 2024, low normal range.  Continue prescription strength vitamin D, refilled today.

## 2024-11-22 NOTE — ASSESSMENT & PLAN NOTE
Stable, doing well on current regimen.  She will continue Singulair, Dulera, and albuterol as prescribed.  Refills provided today.

## 2024-11-22 NOTE — ASSESSMENT & PLAN NOTE
You have been diagnosed with an upper respiratory tract infection (URI), which is an inflammation/infection of the upper respiratory tract /lungs.  These are almost viral in nature, and therefore treatment is management of symptoms. Treatment with an antibiotic for typical URI does not help, and can cause harm from side effects of medications and bacterial resistance. Will treat symptomatically as noted below, however, prescription for antibiotic has been provided incase symptoms do not improve in the next 7-10 days patient can fill this.     #Antibiotic rx provided - Azithromycin     #Other non-prescriptions measures as discussed below.     To minimize these symptoms, we look to treat the underlying cause of poor ventilation. Swelling related to allergies or inflammation can sometimes be helped by nasal steroid sprays such as Flonase or Nasacort over time (7 - 10 days)      If secretions are THICK:  Drink at least 6-8 glasses of water daily to thin secretions.  Mucinex can be used if secretions remain thick.     If secretions are THIN, watery, and abundant:  Antihistamine such as loratadine (claritin) can help dry up a drippy nose.       A teaspoon of honey every 4 hours as needed for cough has been shown to reduce cough as well or better than over-the-counter cough suppressants. Cough drops can also be helpful.      Gargling with warm salt water can help clear post nasal drip and soothe a sore throat.  Throat lozenges can also be helpful.       Fever or aches can be helped by taking acetaminophen (Tylenol) every four hours as needed, or ibuprofen (Motrin, Advil) or naproxen (Aleve) as directed if you are able.      Other options to help open up nasal passages and Eustachian tubes can include non-medicine intervention such as steam, essential oils such as Eucalyptus, peppermint, rosemary added to a diffuser or humidifier.     Mobilizing fluid and helping with congestion through repetitive exercise such as  rebounding on a mini- trampoline, jumping jacks, or running may all help.      URIs usually improves within 2 weeks, but at times the cough will persist for three  or four weeks beyond that. If you are experiencing any shortness of breath, developing wheezing, or getting worse rather than better after the above measures, and call the office for further evaluation.

## 2024-11-22 NOTE — ASSESSMENT & PLAN NOTE
Doing well per patient report.    Continue Prozac 40 mg daily.    Continue Wellbutrin 150 mg daily, recommend you take this when you first break your fast in the AM. I would avoid taking this too late in the day as this can cause some insomnia.    Will check scales upon rooming at next visit.

## 2024-11-22 NOTE — ASSESSMENT & PLAN NOTE
s/p surgery to remove parathyroids and thyroid in 2013 2/2 thyroid carcinoma    Patient to [facility] and follow-up as per endo.

## 2024-11-26 ENCOUNTER — TELEPHONE (OUTPATIENT)
Dept: PRIMARY CARE | Facility: CLINIC | Age: 59
End: 2024-11-26
Payer: COMMERCIAL

## 2024-11-26 DIAGNOSIS — E78.5 HYPERLIPIDEMIA, UNSPECIFIED HYPERLIPIDEMIA TYPE: ICD-10-CM

## 2024-11-26 DIAGNOSIS — E66.01 CLASS 2 SEVERE OBESITY WITH SERIOUS COMORBIDITY AND BODY MASS INDEX (BMI) OF 39.0 TO 39.9 IN ADULT, UNSPECIFIED OBESITY TYPE: ICD-10-CM

## 2024-11-26 DIAGNOSIS — E66.812 CLASS 2 SEVERE OBESITY WITH SERIOUS COMORBIDITY AND BODY MASS INDEX (BMI) OF 39.0 TO 39.9 IN ADULT, UNSPECIFIED OBESITY TYPE: ICD-10-CM

## 2024-11-26 DIAGNOSIS — I25.10 MILD CAD: ICD-10-CM

## 2024-11-26 DIAGNOSIS — J06.9 PROTRACTED URI: ICD-10-CM

## 2024-11-26 RX ORDER — AZITHROMYCIN 250 MG/1
TABLET, FILM COATED ORAL
Qty: 6 TABLET | Refills: 0 | Status: SHIPPED | OUTPATIENT
Start: 2024-11-26 | End: 2024-11-30

## 2024-11-26 NOTE — TELEPHONE ENCOUNTER
Pt called because 2 of her medications were sent to the wrong placed. Pt needs the following [facility]:    Zepbound 2.5 mg  Zithromax 250 mg    Pt would like them sent to [facility] in [place]. Pt would like a cb once the are sent.

## 2024-12-09 ENCOUNTER — APPOINTMENT (OUTPATIENT)
Dept: PHARMACY | Facility: HOSPITAL | Age: 59
End: 2024-12-09
Payer: COMMERCIAL

## 2024-12-09 DIAGNOSIS — E66.01 CLASS 2 SEVERE OBESITY WITH SERIOUS COMORBIDITY AND BODY MASS INDEX (BMI) OF 39.0 TO 39.9 IN ADULT, UNSPECIFIED OBESITY TYPE: ICD-10-CM

## 2024-12-09 DIAGNOSIS — E66.812 CLASS 2 SEVERE OBESITY WITH SERIOUS COMORBIDITY AND BODY MASS INDEX (BMI) OF 39.0 TO 39.9 IN ADULT, UNSPECIFIED OBESITY TYPE: ICD-10-CM

## 2024-12-09 NOTE — PROGRESS NOTES
Clinical Pharmacy Appointment    Patient ID: Arlette Cedeño is a 59 y.o. female who presents for weight loss management.     Pt is here for First appointment.     Referring Provider: Meghan Holland, *  PCP: Meghan Holland,    Last visit with PCP: 11/22/2024   Next visit with PCP: 6/10/2025      Subjective     WEIGHT LOSS  BMI Readings from Last 3 Encounters:   11/22/24 39.74 kg/m²   04/23/24 39.36 kg/m²   04/02/24 37.76 kg/m²      Starting weight: 231 lbs.     Non-Pharmacological Therapy  Weight loss techniques attempted:  Self directed dieting    Relevant PMH:  PMH of Pancreatitis? none  PMH of Retinopathy? none  PMH of MTC? none    Insurance coverage of weight-loss medications? No, PA was denied on 12/5/24.    Eligible for [employer] cards/programs? Yes  Eligible for  PAP? No, reports income >400% FPL    Medication Estimated Cost Expected weight loss Patient Risks/Cautions Notes   Wegovy (semaglutide) ~$650/month w/ savings card 10-20% None      Zepbound (tirzepatide) $650/month   w/ savings card 10-20%     Qsymia (phentermine-topiramate) $98/month via [employer]symia [employer] 5-10% None Controlled substance   Contrave (bupropion-naltrexone) $99/month   via [employer] 5-10% None    Adipex (phentermine) ~$15/month 3-5% None Controlled substance,  Short-term use only   Navarro (OTC Orlistat) ~$60/month 3-5%  Adverse effects likely outweigh benefit.        Medication Reconciliation:  Changed: none  Added: none  Discontinued: Clindamycin 1%  lotion     Drug Interactions  No relevant drug interactions were noted.    Medication System Management  Patient's preferred pharmacy: [facility] [place] [facility] [place]  Adherence/Organization: n/a  Affordability/Accessibility: n/a      Objective   Allergies   Allergen Reactions    Cat Hair Standardized Allergenic Extract Shortness of breath    Amoxicillin Unknown    Doxycycline Photosensitivity    Garlic Other     Upset stomach    Metformin GI Upset     Social History  "    Social History Narrative    Not on file      Medication Review  Current Outpatient Medications   Medication Instructions    albuterol 90 mcg/actuation inhaler USE 2 INHALATIONS SPACED 60 SECONDS APART EVERY 4 TO 6 HOURS    benzoyl peroxide 5 % external wash 1 Application, Every Day    buPROPion XL (WELLBUTRIN XL) 150 mg, oral, Every morning, DO NOT CRUSH CHEW OR SPLIT    calcitriol (ROCALTROL) 0.25 mcg, oral, 5 times daily    clindamycin (Cleocin T) 1 % lotion Apply pea sized amount to affected area once daily    ergocalciferol (VITAMIN D-2) 1,250 mcg, oral, Once Weekly    FLUoxetine (PROZAC) 20 mg, oral, Daily    levothyroxine (SYNTHROID, LEVOXYL) 125 mcg, oral, Daily    mometasone-formoterol (Dulera) 200-5 mcg/actuation inhaler 2 puffs, inhalation, 2 times daily    montelukast (SINGULAIR) 10 mg, oral, Daily    rosuvastatin (CRESTOR) 10 mg, oral, Daily    tirzepatide (weight loss) (ZEPBOUND) 2.5 mg, subcutaneous, Every 7 days    triamcinolone (Kenalog) 0.1 % cream 1 Application, Topical, 2 times daily      Vitals  BP Readings from Last 2 Encounters:   11/22/24 116/72   04/23/24 109/72     BMI Readings from Last 1 Encounters:   11/22/24 39.74 kg/m²      Labs  A1C  Lab Results   Component Value Date    HGBA1C 6.1 (H) 07/09/2024    HGBA1C 6.0 04/23/2024    HGBA1C 6.0 (H) 12/11/2023     BMP  Lab Results   Component Value Date    CALCIUM 7.9 (L) 07/09/2024     07/09/2024    K 3.8 07/09/2024    CO2 31 07/09/2024     07/09/2024    BUN 11 07/09/2024    CREATININE 0.77 07/09/2024    EGFR 90 07/09/2024     LFTs  Lab Results   Component Value Date    ALT 33 07/09/2024    AST 22 07/09/2024    ALKPHOS 42 07/09/2024    BILITOT 0.5 07/09/2024     FLP  Lab Results   Component Value Date    TRIG 164 (H) 07/09/2024    CHOL 240 (H) 07/09/2024    LDLF 140 (H) 04/09/2021    LDLCALC 163 (H) 07/09/2024    HDL 44.1 07/09/2024     Urine Microalbumin  No results found for: \"MICROALBCREA\"  Weight Management  Wt Readings from " Last 3 Encounters:   11/22/24 105 kg (231 lb 8 oz)   04/23/24 104 kg (229 lb 4.8 oz)   04/02/24 99.8 kg (220 lb)      There is no height or weight on file to calculate BMI.     Assessment/Plan   Problem List Items Addressed This Visit       Class 2 severe obesity with serious comorbidity and body mass index (BMI) of 39.0 to 39.9 in adult     Patient was referred by her PCP to the clinical pharmacy team for cost assistance for Zepbound. Discussed with patient that her insurance required a prior authorization which was denied on 12/5/24. Explained with patient the process for submitting an appeal. Discussed [facility] PAP with patient and screened for eligibility, however patient does not qualify at this time due to reporting income >400% FPL.  Patient was agreaable. Answered all the patient's questions and concerns.     PLAN   Submit appeal for PA denial. Will follow up with patient in 2 weeks to review status.          Relevant Orders    Referral to Clinical Pharmacy       Clinical Pharmacist follow-up: 2 weeks, Telehealth visit    Continue all meds under the continuation of care with the referring provider and clinical pharmacy team.    Thank you,  Elle Cheatham  PGY-1 Pharmacy Resident   [phone]    Verbal consent to manage patient's drug therapy was obtained from the patient. They were informed they may decline to participate or withdraw from participation in pharmacy services at any time.

## 2024-12-09 NOTE — ASSESSMENT & PLAN NOTE
Patient was referred by her PCP to the clinical pharmacy team for cost assistance for Zepbound. Discussed with patient that her insurance required a prior authorization which was denied on 12/5/24. Explained with patient the process for submitting an appeal. Discussed [facility] PAP with patient and screened for eligibility, however patient does not qualify at this time due to reporting income >400% FPL.  Patient was agreaable. Answered all the patient's questions and concerns.     PLAN   Submit appeal for PA denial. Will follow up with patient in 2 weeks to review status.

## 2024-12-10 NOTE — PROGRESS NOTES
I reviewed the progress note and agree with the resident’s findings and plans as written. Case discussed with resident.    Kaitlynn Reese, YonathanD

## 2024-12-19 ENCOUNTER — APPOINTMENT (OUTPATIENT)
Dept: PHARMACY | Facility: HOSPITAL | Age: 59
End: 2024-12-19
Payer: COMMERCIAL

## 2024-12-19 DIAGNOSIS — E66.01 CLASS 2 SEVERE OBESITY WITH SERIOUS COMORBIDITY AND BODY MASS INDEX (BMI) OF 39.0 TO 39.9 IN ADULT, UNSPECIFIED OBESITY TYPE: ICD-10-CM

## 2024-12-19 DIAGNOSIS — E66.812 CLASS 2 SEVERE OBESITY WITH SERIOUS COMORBIDITY AND BODY MASS INDEX (BMI) OF 39.0 TO 39.9 IN ADULT, UNSPECIFIED OBESITY TYPE: ICD-10-CM

## 2024-12-19 NOTE — ASSESSMENT & PLAN NOTE
Patient was referred by her PCP to the clinical pharmacy team for cost assistance for Zepbound. A prior authorization for Zepbound was submitted and approved on 12/19/24. Discussed with patient mechanism of action, side effects, how to administer Zepbound, and healthy weight loss goal of 1-2 lbs/week. Patient was agreeable to starting Zepbound. Answered all patient's questions and concerns.    PLAN  START Zepbound 2.5 mg - Inject 2.5 mg under the skin once weekly.    Zepbound Education:   - Provided detailed dosing and administration counseling to ensure proper technique.   - Reviewed Zepbound titration schedule, starting with 2.5 mg once weekly to a goal of 15 mg once weekly if tolerated  - Counseled patient on the benefits of GLP-1ra glycemic control and weight loss  - Reviewed storage requirements of Zepbound when not in use, and when to administer the medication if a dose is missed.  - Advised patient that they may experience improved satiety after meals and portion sizes of meals may be reduced as doses of Zepbound increase.

## 2024-12-19 NOTE — PROGRESS NOTES
Clinical Pharmacy Appointment    Patient ID: Arlette Cedeño is a 59 y.o. female who presents for weight loss management.     Pt is here for Follow Up appointment.     Referring Provider: Meghan Holland, *  PCP: Meghan Holland,    Last visit with PCP: 11/22/2024   Next visit with PCP: 6/10/2025      Subjective     WEIGHT LOSS  BMI Readings from Last 3 Encounters:   11/22/24 39.74 kg/m²   04/23/24 39.36 kg/m²   04/02/24 37.76 kg/m²      Starting weight: 231 lbs.     Non-Pharmacological Therapy  Weight loss techniques attempted:  Self directed dieting    Relevant PMH:  PMH of Pancreatitis? none  PMH of Retinopathy? none  PMH of MTC? none    Insurance coverage of weight-loss medications? No, PA was denied on 12/5/24.    Eligible for copay cards/programs? Yes  Eligible for  PAP? No, reports income >400% FPL    Medication Estimated Cost Expected weight loss Patient Risks/Cautions Notes   Wegovy (semaglutide) ~$650/month w/ savings card 10-20% None      Zepbound (tirzepatide) $650/month   w/ savings card 10-20%     Qsymia (phentermine-topiramate) $98/month via [employer]symia Engage 5-10% None Controlled substance   Contrave (bupropion-naltrexone) $99/month   via [employer]Access 5-10% None    Adipex (phentermine) ~$15/month 3-5% None Controlled substance,  Short-term use only   Navarro (OTC Orlistat) ~$60/month 3-5%  Adverse effects likely outweigh benefit.        Medication Reconciliation:  Changed: none  Added: none  Discontinued: Clindamycin 1%  lotion     Drug Interactions  No relevant drug interactions were noted.    Medication System Management  Patient's preferred pharmacy: [facility] - [place], [place]  Adherence/Organization: n/a  Affordability/Accessibility: n/a      Objective   Allergies   Allergen Reactions    Cat Hair Standardized Allergenic Extract Shortness of breath    Amoxicillin Unknown    Doxycycline Photosensitivity    Garlic Other     Upset stomach    Metformin GI Upset     Social History  "    Social History Narrative    Not on file      Medication Review  Current Outpatient Medications   Medication Instructions    albuterol 90 mcg/actuation inhaler USE 2 INHALATIONS SPACED 60 SECONDS APART EVERY 4 TO 6 HOURS    benzoyl peroxide 5 % external wash 1 Application, Every Day    buPROPion XL (WELLBUTRIN XL) 150 mg, oral, Every morning, DO NOT CRUSH CHEW OR SPLIT    calcitriol (ROCALTROL) 0.25 mcg, oral, 5 times daily    clindamycin (Cleocin T) 1 % lotion Apply pea sized amount to affected area once daily    ergocalciferol (VITAMIN D-2) 1,250 mcg, oral, Once Weekly    FLUoxetine (PROZAC) 20 mg, oral, Daily    levothyroxine (SYNTHROID, LEVOXYL) 125 mcg, oral, Daily    mometasone-formoterol (Dulera) 200-5 mcg/actuation inhaler 2 puffs, inhalation, 2 times daily    montelukast (SINGULAIR) 10 mg, oral, Daily    rosuvastatin (CRESTOR) 10 mg, oral, Daily    tirzepatide (weight loss) (ZEPBOUND) 2.5 mg, subcutaneous, Every 7 days    triamcinolone (Kenalog) 0.1 % cream 1 Application, Topical, 2 times daily      Vitals  BP Readings from Last 2 Encounters:   11/22/24 116/72   04/23/24 109/72     BMI Readings from Last 1 Encounters:   11/22/24 39.74 kg/m²      Labs  A1C  Lab Results   Component Value Date    HGBA1C 6.1 (H) 07/09/2024    HGBA1C 6.0 04/23/2024    HGBA1C 6.0 (H) 12/11/2023     BMP  Lab Results   Component Value Date    CALCIUM 7.9 (L) 07/09/2024     07/09/2024    K 3.8 07/09/2024    CO2 31 07/09/2024     07/09/2024    BUN 11 07/09/2024    CREATININE 0.77 07/09/2024    EGFR 90 07/09/2024     LFTs  Lab Results   Component Value Date    ALT 33 07/09/2024    AST 22 07/09/2024    ALKPHOS 42 07/09/2024    BILITOT 0.5 07/09/2024     FLP  Lab Results   Component Value Date    TRIG 164 (H) 07/09/2024    CHOL 240 (H) 07/09/2024    LDLF 140 (H) 04/09/2021    LDLCALC 163 (H) 07/09/2024    HDL 44.1 07/09/2024     Urine Microalbumin  No results found for: \"MICROALBCREA\"  Weight Management  Wt Readings from " Last 3 Encounters:   11/22/24 105 kg (231 lb 8 oz)   04/23/24 104 kg (229 lb 4.8 oz)   04/02/24 99.8 kg (220 lb)      There is no height or weight on file to calculate BMI.     Assessment/Plan   Problem List Items Addressed This Visit       Class 2 severe obesity with serious comorbidity and body mass index (BMI) of 39.0 to 39.9 in adult     Patient was referred by her PCP to the clinical pharmacy team for cost assistance for Zepbound. A prior authorization for Zepbound was submitted and approved on 12/19/24. Discussed with patient mechanism of action, side effects, how to administer Zepbound, and healthy weight loss goal of 1-2 lbs/week. Patient was agreeable to starting Zepbound. Answered all patient's questions and concerns.    PLAN  START Zepbound 2.5 mg - Inject 2.5 mg under the skin once weekly.    Zepbound Education:   - Provided detailed dosing and administration counseling to ensure proper technique.   - Reviewed Zepbound titration schedule, starting with 2.5 mg once weekly to a goal of 15 mg once weekly if tolerated  - Counseled patient on the benefits of GLP-1ra glycemic control and weight loss  - Reviewed storage requirements of Zepbound when not in use, and when to administer the medication if a dose is missed.  - Advised patient that they may experience improved satiety after meals and portion sizes of meals may be reduced as doses of Zepbound increase.         Relevant Orders    Referral to Clinical Pharmacy                    Clinical Pharmacist follow-up: 4 weeks, Telehealth visit    Continue all meds under the continuation of care with the referring provider and clinical pharmacy team.    Thank you,  Elle Cheatham  PGY-1 Pharmacy Resident   [phone]    Verbal consent to manage patient's drug therapy was obtained from the patient. They were informed they may decline to participate or withdraw from participation in pharmacy services at any time.

## 2024-12-20 ENCOUNTER — TELEPHONE (OUTPATIENT)
Dept: PRIMARY CARE | Facility: CLINIC | Age: 59
End: 2024-12-20
Payer: COMMERCIAL

## 2024-12-20 NOTE — TELEPHONE ENCOUNTER
Patient called and asked for another script for a z-milton.  She said the first one helped but she is getting worse again.

## 2024-12-23 NOTE — TELEPHONE ENCOUNTER
Pt has not been seen since 11/22/24 and not for these symptoms. Advised her to go to urgent care for treatment.

## 2025-01-16 ENCOUNTER — APPOINTMENT (OUTPATIENT)
Dept: PHARMACY | Facility: HOSPITAL | Age: 60
End: 2025-01-16
Payer: COMMERCIAL

## 2025-01-16 DIAGNOSIS — E66.01 CLASS 2 SEVERE OBESITY WITH SERIOUS COMORBIDITY AND BODY MASS INDEX (BMI) OF 39.0 TO 39.9 IN ADULT, UNSPECIFIED OBESITY TYPE: ICD-10-CM

## 2025-01-16 DIAGNOSIS — E66.812 CLASS 2 SEVERE OBESITY WITH SERIOUS COMORBIDITY AND BODY MASS INDEX (BMI) OF 39.0 TO 39.9 IN ADULT, UNSPECIFIED OBESITY TYPE: ICD-10-CM

## 2025-01-16 NOTE — PROGRESS NOTES
Clinical Pharmacy Appointment    Patient ID: Arlette Cedeño is a 59 y.o. female who presents for weight loss management.     Pt is here for Follow Up appointment.     Referring Provider: Meghan Holland, *  PCP: Meghan Holland,    Last visit with PCP: 11/22/2024   Next visit with PCP: 6/10/2025      Subjective     WEIGHT LOSS  BMI Readings from Last 3 Encounters:   11/22/24 39.74 kg/m²   04/23/24 39.36 kg/m²   04/02/24 37.76 kg/m²      Starting weight: 231 lbs.     Non-Pharmacological Therapy  Weight loss techniques attempted:  Self directed dieting    Relevant PMH:  PMH of Pancreatitis? none  PMH of Retinopathy? none  PMH of MTC? none    Insurance coverage of weight-loss medications? No, PA was denied on 12/5/24.    Eligible for copay cards/programs? Yes  Eligible for  PAP? No, reports income >400% FPL    Medication Estimated Cost Expected weight loss Patient Risks/Cautions Notes   Wegovy (semaglutide) ~$650/month w/ savings card 10-20% None      Zepbound (tirzepatide) $650/month   w/ savings card 10-20%     Qsymia (phentermine-topiramate) $98/month via Qsymia Engage 5-10% None Controlled substance   Contrave (bupropion-naltrexone) $99/month   via CurAccess 5-10% None    Adipex (phentermine) ~$15/month 3-5% None Controlled substance,  Short-term use only   Navarro (OTC Orlistat) ~$60/month 3-5%  Adverse effects likely outweigh benefit.        Medication Reconciliation:  Changed: none  Added: none  Discontinued: Clindamycin 1%  lotion     Drug Interactions  No relevant drug interactions were noted.    Medication System Management  Patient's preferred pharmacy: [facility] - [place], [place]  Adherence/Organization: n/a  Affordability/Accessibility: n/a      Objective   Allergies   Allergen Reactions    Cat Hair Standardized Allergenic Extract Shortness of breath    Amoxicillin Unknown    Doxycycline Photosensitivity    Garlic Other     Upset stomach    Metformin GI Upset     Social History  "    Social History Narrative    Not on file      Medication Review  Current Outpatient Medications   Medication Instructions    albuterol 90 mcg/actuation inhaler USE 2 INHALATIONS SPACED 60 SECONDS APART EVERY 4 TO 6 HOURS    benzoyl peroxide 5 % external wash 1 Application, Every Day    buPROPion XL (WELLBUTRIN XL) 150 mg, oral, Every morning, DO NOT CRUSH CHEW OR SPLIT    calcitriol (ROCALTROL) 0.25 mcg, oral, 5 times daily    clindamycin (Cleocin T) 1 % lotion Apply pea sized amount to affected area once daily    ergocalciferol (VITAMIN D-2) 1,250 mcg, oral, Once Weekly    FLUoxetine (PROZAC) 20 mg, oral, Daily    levothyroxine (SYNTHROID, LEVOXYL) 125 mcg, oral, Daily    mometasone-formoterol (Dulera) 200-5 mcg/actuation inhaler 2 puffs, inhalation, 2 times daily    montelukast (SINGULAIR) 10 mg, oral, Daily    rosuvastatin (CRESTOR) 10 mg, oral, Daily    triamcinolone (Kenalog) 0.1 % cream 1 Application, Topical, 2 times daily      Vitals  BP Readings from Last 2 Encounters:   11/22/24 116/72   04/23/24 109/72     BMI Readings from Last 1 Encounters:   11/22/24 39.74 kg/m²      Labs  A1C  Lab Results   Component Value Date    HGBA1C 6.1 (H) 07/09/2024    HGBA1C 6.0 04/23/2024    HGBA1C 6.0 (H) 12/11/2023     BMP  Lab Results   Component Value Date    CALCIUM 7.9 (L) 07/09/2024     07/09/2024    K 3.8 07/09/2024    CO2 31 07/09/2024     07/09/2024    BUN 11 07/09/2024    CREATININE 0.77 07/09/2024    EGFR 90 07/09/2024     LFTs  Lab Results   Component Value Date    ALT 33 07/09/2024    AST 22 07/09/2024    ALKPHOS 42 07/09/2024    BILITOT 0.5 07/09/2024     FLP  Lab Results   Component Value Date    TRIG 164 (H) 07/09/2024    CHOL 240 (H) 07/09/2024    LDLF 140 (H) 04/09/2021    LDLCALC 163 (H) 07/09/2024    HDL 44.1 07/09/2024     Urine Microalbumin  No results found for: \"MICROALBCREA\"  Weight Management  Wt Readings from Last 3 Encounters:   11/22/24 105 kg (231 lb 8 oz)   04/23/24 104 kg (229 " lb 4.8 oz)   04/02/24 99.8 kg (220 lb)      There is no height or weight on file to calculate BMI.     Assessment/Plan   Problem List Items Addressed This Visit       Class 2 severe obesity with serious comorbidity and body mass index (BMI) of 39.0 to 39.9 in adult     Patient was referred by her PCP to the clinical pharmacy team for weight loss management for Zepbound. Patient has completed 3 injections of Zepbound 2.5 mg and has lost 8 lbs. Patient experienced some constipation, but it has been improving. Counseled patient on taking Miralax or increasing daily fiber to assist with constipation. Discussed with patient increasing dose as she has been tolerating medication. Patient was agreeable.     PLAN  INCREASE to Zepbound 5 mg - Inject 5 mg under the skin once weekly.  Prescription sent [facility].            Relevant Orders    Referral to Clinical Pharmacy         Clinical Pharmacist follow-up: 4 weeks, Telehealth visit    Continue all meds under the continuation of care with the referring provider and clinical pharmacy team.    Thank you,  Elle Cheatham  PGY-1 Pharmacy Resident   [phone]    Verbal consent to manage patient's drug therapy was obtained from the patient. They were informed they may decline to participate or withdraw from participation in pharmacy services at any time.

## 2025-01-16 NOTE — ASSESSMENT & PLAN NOTE
Patient was referred by her PCP to the clinical pharmacy team for weight loss management for Zepbound. Patient has completed 3 injections of Zepbound 2.5 mg and has lost 8 lbs. Patient experienced some constipation, but it has been improving. Counseled patient on taking Miralax or increasing daily fiber to assist with constipation. Discussed with patient increasing dose as she has been tolerating medication. Patient was agreeable.     PLAN  INCREASE to Zepbound 5 mg - Inject 5 mg under the skin once weekly.  Prescription sent [facility].

## 2025-02-13 ENCOUNTER — APPOINTMENT (OUTPATIENT)
Dept: PHARMACY | Facility: HOSPITAL | Age: 60
End: 2025-02-13
Payer: COMMERCIAL

## 2025-02-13 DIAGNOSIS — E66.812 CLASS 2 SEVERE OBESITY WITH SERIOUS COMORBIDITY AND BODY MASS INDEX (BMI) OF 39.0 TO 39.9 IN ADULT, UNSPECIFIED OBESITY TYPE: ICD-10-CM

## 2025-02-13 DIAGNOSIS — E66.01 CLASS 2 SEVERE OBESITY WITH SERIOUS COMORBIDITY AND BODY MASS INDEX (BMI) OF 39.0 TO 39.9 IN ADULT, UNSPECIFIED OBESITY TYPE: ICD-10-CM

## 2025-02-13 NOTE — PROGRESS NOTES
Clinical Pharmacy Appointment    Patient ID: Arlette Cedeño is a 59 y.o. female who presents for weight loss management.     Pt is here for Follow Up appointment.     Referring Provider: Meghan Holland, *  PCP: Meghan Holland DO   Last visit with PCP: 11/22/2024   Next visit with PCP: 6/10/2025      Subjective     WEIGHT LOSS  BMI Readings from Last 3 Encounters:   11/22/24 39.74 kg/m²   04/23/24 39.36 kg/m²   04/02/24 37.76 kg/m²      Starting weight: 231 lbs.   Current weight: 209 lbs  Goal: 140 lbs    Non-Pharmacological Therapy  Weight loss techniques attempted:  Self directed dieting    Relevant PMH:  PMH of Pancreatitis? none  PMH of Retinopathy? none  PMH of MTC? none    Insurance coverage of weight-loss medications? No, PA was approved on 12/19/24.   Eligible for copay cards/programs? Yes  Eligible for  PAP? No, reports income >400% FPL    Medication Estimated Cost Expected weight loss Patient Risks/Cautions Notes   Wegovy (semaglutide) ~$650/month w/ savings card 10-20% None      Zepbound (tirzepatide) $650/month   w/ savings card 10-20%     Qsymia (phentermine-topiramate) $98/month via Qsymia Engage 5-10% None Controlled substance   Contrave (bupropion-naltrexone) $99/month   via [employer]Access 5-10% None    Adipex (phentermine) ~$15/month 3-5% None Controlled substance,  Short-term use only   Navarro (OTC Orlistat) ~$60/month 3-5%  Adverse effects likely outweigh benefit.        Medication Reconciliation:  Changed: none  Added: none  Discontinued: Clindamycin 1%  lotion     Drug Interactions  No relevant drug interactions were noted.    Medication System Management  Patient's preferred pharmacy: [facility] - [place], [place]  Adherence/Organization: n/a  Affordability/Accessibility: n/a      Objective   Allergies   Allergen Reactions    Cat Hair Standardized Allergenic Extract Shortness of breath    Amoxicillin Unknown    Doxycycline Photosensitivity    Garlic Other     Upset stomach     Metformin GI Upset     Social History     Social History Narrative    Not on file      Medication Review  Current Outpatient Medications   Medication Instructions    albuterol 90 mcg/actuation inhaler USE 2 INHALATIONS SPACED 60 SECONDS APART EVERY 4 TO 6 HOURS    benzoyl peroxide 5 % external wash 1 Application, Every Day    buPROPion XL (WELLBUTRIN XL) 150 mg, oral, Every morning, DO NOT CRUSH CHEW OR SPLIT    calcitriol (ROCALTROL) 0.25 mcg, oral, 5 times daily    clindamycin (Cleocin T) 1 % lotion Apply pea sized amount to affected area once daily    ergocalciferol (VITAMIN D-2) 1,250 mcg, oral, Once Weekly    FLUoxetine (PROZAC) 20 mg, oral, Daily    levothyroxine (SYNTHROID, LEVOXYL) 125 mcg, oral, Daily    mometasone-formoterol (Dulera) 200-5 mcg/actuation inhaler 2 puffs, inhalation, 2 times daily    montelukast (SINGULAIR) 10 mg, oral, Daily    rosuvastatin (CRESTOR) 10 mg, oral, Daily    tirzepatide (weight loss) (ZEPBOUND) 5 mg, subcutaneous, Every 7 days    triamcinolone (Kenalog) 0.1 % cream 1 Application, Topical, 2 times daily      Vitals  BP Readings from Last 2 Encounters:   11/22/24 116/72   04/23/24 109/72     BMI Readings from Last 1 Encounters:   11/22/24 39.74 kg/m²      Labs  A1C  Lab Results   Component Value Date    HGBA1C 6.1 (H) 07/09/2024    HGBA1C 6.0 04/23/2024    HGBA1C 6.0 (H) 12/11/2023     BMP  Lab Results   Component Value Date    CALCIUM 7.9 (L) 07/09/2024     07/09/2024    K 3.8 07/09/2024    CO2 31 07/09/2024     07/09/2024    BUN 11 07/09/2024    CREATININE 0.77 07/09/2024    EGFR 90 07/09/2024     LFTs  Lab Results   Component Value Date    ALT 33 07/09/2024    AST 22 07/09/2024    ALKPHOS 42 07/09/2024    BILITOT 0.5 07/09/2024     FLP  Lab Results   Component Value Date    TRIG 164 (H) 07/09/2024    CHOL 240 (H) 07/09/2024    LDLF 140 (H) 04/09/2021    LDLCALC 163 (H) 07/09/2024    HDL 44.1 07/09/2024     Urine Microalbumin  No results found for:  "\"MICROALBCREA\"  Weight Management  Wt Readings from Last 3 Encounters:   11/22/24 105 kg (231 lb 8 oz)   04/23/24 104 kg (229 lb 4.8 oz)   04/02/24 99.8 kg (220 lb)      There is no height or weight on file to calculate BMI.     Assessment/Plan   Problem List Items Addressed This Visit       Class 2 severe obesity with serious comorbidity and body mass index (BMI) of 39.0 to 39.9 in adult     Patient was referred by her PCP to the clinical pharmacy team for weight loss management for Zepbound. Patient has completed 3 injections of Zepbound 5 mg and has lost a total of 22 lbs. Patient reported minimal nausea the day after injection, but states it is bearable. Discussed with patient continuing current dose as she is consistently losing weight. Patient was agreeable.     PLAN  CONTINUE Zepbound 5 mg - Inject 5 mg under the skin once weekly.  Prescription sent [facility].                   Clinical Pharmacist follow-up: 4 weeks, Telehealth visit    Continue all meds under the continuation of care with the referring provider and clinical pharmacy team.    Thank you,  Elle Cheatham  PGY-1 Pharmacy Resident   [phone]    Verbal consent to manage patient's drug therapy was obtained from the patient. They were informed they may decline to participate or withdraw from participation in pharmacy services at any time.        "

## 2025-02-13 NOTE — ASSESSMENT & PLAN NOTE
Patient was referred by her PCP to the clinical pharmacy team for weight loss management for Zepbound. Patient has completed 3 injections of Zepbound 5 mg and has lost a total of 22 lbs. Patient reported minimal nausea the day after injection, but states it is bearable. Discussed with patient continuing current dose as she is consistently losing weight. Patient was agreeable.     PLAN  CONTINUE Zepbound 5 mg - Inject 5 mg under the skin once weekly.  Prescription sent [facility].

## 2025-03-13 ENCOUNTER — APPOINTMENT (OUTPATIENT)
Dept: PHARMACY | Facility: HOSPITAL | Age: 60
End: 2025-03-13
Payer: COMMERCIAL

## 2025-03-13 DIAGNOSIS — E66.812 CLASS 2 SEVERE OBESITY WITH SERIOUS COMORBIDITY AND BODY MASS INDEX (BMI) OF 39.0 TO 39.9 IN ADULT, UNSPECIFIED OBESITY TYPE: ICD-10-CM

## 2025-03-13 DIAGNOSIS — E66.01 CLASS 2 SEVERE OBESITY WITH SERIOUS COMORBIDITY AND BODY MASS INDEX (BMI) OF 39.0 TO 39.9 IN ADULT, UNSPECIFIED OBESITY TYPE: ICD-10-CM

## 2025-03-13 NOTE — PROGRESS NOTES
Clinical Pharmacy Appointment    Patient ID: Arlette Cedeño is a 59 y.o. female who presents for weight loss management.     Pt is here for Follow Up appointment.     Referring Provider: Callie Lemus DO  PCP: Meghan Holland DO   Last visit with PCP: 11/22/2024   Next visit with PCP: 6/10/2025      Subjective     WEIGHT LOSS  BMI Readings from Last 3 Encounters:   11/22/24 39.74 kg/m²   04/23/24 39.36 kg/m²   04/02/24 37.76 kg/m²      Starting weight: 231 lbs.   Current weight: 203 lbs  Goal: 140 lbs    Current  Pharmacotherapy:   Zepbound 5 mg   Reports minimal nausea the day after injection, but states it is bearable.     Non-Pharmacological Therapy  Weight loss techniques attempted:  Self directed dieting    Relevant PMH:  PMH of Pancreatitis? none  PMH of Retinopathy? none  PMH of MTC? none    Insurance coverage of weight-loss medications? No, PA was approved on 12/19/24.   Eligible for [employer] cards/programs? Yes  Eligible for  PAP? No, reports income >400% FPL    Medication Estimated Cost Expected weight loss Patient Risks/Cautions Notes   Wegovy (semaglutide) ~$650/month w/ savings card 10-20% None      Zepbound (tirzepatide) $650/month   w/ savings card 10-20%     Qsymia (phentermine-topiramate) $98/month via Qsymia [employer] 5-10% None Controlled substance   Contrave (bupropion-naltrexone) $99/month   via [employer] 5-10% None    Adipex (phentermine) ~$15/month 3-5% None Controlled substance,  Short-term use only   Navarro (OTC Orlistat) ~$60/month 3-5%  Adverse effects likely outweigh benefit.        Medication Reconciliation:  Changed: none  Added: none  Discontinued: Clindamycin 1%  lotion     Drug Interactions  No relevant drug interactions were noted.    Medication System Management  Patient's preferred pharmacy: [facility] - [place], [place]  Adherence/Organization: n/a  Affordability/Accessibility: n/a      Objective   Allergies   Allergen Reactions    Cat Hair Standardized Allergenic  Extract Shortness of breath    Amoxicillin Unknown    Doxycycline Photosensitivity    Garlic Other     Upset stomach    Metformin GI Upset     Social History     Social History Narrative    Not on file      Medication Review  Current Outpatient Medications   Medication Instructions    albuterol 90 mcg/actuation inhaler USE 2 INHALATIONS SPACED 60 SECONDS APART EVERY 4 TO 6 HOURS    benzoyl peroxide 5 % external wash 1 Application, Every Day    buPROPion XL (WELLBUTRIN XL) 150 mg, oral, Every morning, DO NOT CRUSH CHEW OR SPLIT    calcitriol (ROCALTROL) 0.25 mcg, oral, 5 times daily    clindamycin (Cleocin T) 1 % lotion Apply pea sized amount to affected area once daily    ergocalciferol (VITAMIN D-2) 1,250 mcg, oral, Once Weekly    FLUoxetine (PROZAC) 20 mg, oral, Daily    levothyroxine (SYNTHROID, LEVOXYL) 125 mcg, oral, Daily    mometasone-formoterol (Dulera) 200-5 mcg/actuation inhaler 2 puffs, inhalation, 2 times daily    montelukast (SINGULAIR) 10 mg, oral, Daily    rosuvastatin (CRESTOR) 10 mg, oral, Daily    tirzepatide (weight loss) (ZEPBOUND) 7.5 mg, subcutaneous, Every 7 days    triamcinolone (Kenalog) 0.1 % cream 1 Application, Topical, 2 times daily      Vitals  BP Readings from Last 2 Encounters:   11/22/24 116/72   04/23/24 109/72     BMI Readings from Last 1 Encounters:   11/22/24 39.74 kg/m²      Labs  A1C  Lab Results   Component Value Date    HGBA1C 6.1 (H) 07/09/2024    HGBA1C 6.0 04/23/2024    HGBA1C 6.0 (H) 12/11/2023     BMP  Lab Results   Component Value Date    CALCIUM 7.9 (L) 07/09/2024     07/09/2024    K 3.8 07/09/2024    CO2 31 07/09/2024     07/09/2024    BUN 11 07/09/2024    CREATININE 0.77 07/09/2024    EGFR 90 07/09/2024     LFTs  Lab Results   Component Value Date    ALT 33 07/09/2024    AST 22 07/09/2024    ALKPHOS 42 07/09/2024    BILITOT 0.5 07/09/2024     FLP  Lab Results   Component Value Date    TRIG 164 (H) 07/09/2024    CHOL 240 (H) 07/09/2024    LDLF 140 (H)  "04/09/2021    LDLCALC 163 (H) 07/09/2024    HDL 44.1 07/09/2024     Urine Microalbumin  No results found for: \"MICROALBCREA\"  Weight Management  Wt Readings from Last 3 Encounters:   11/22/24 105 kg (231 lb 8 oz)   04/23/24 104 kg (229 lb 4.8 oz)   04/02/24 99.8 kg (220 lb)      There is no height or weight on file to calculate BMI.     Assessment/Plan   Problem List Items Addressed This Visit       Class 2 severe obesity with serious comorbidity and body mass index (BMI) of 39.0 to 39.9 in adult     Patient was referred by her PCP to the clinical pharmacy team for weight loss management.  Patient is currently on Zepbound 5 mg has lost a total of 28 lbs. Patient reported minimal nausea the day after injection, but states it is bearable. Patient reported that weight loss has slowed down and discussed with patient increasing Zepbound dose. Patient was agreeable.     PLAN  INCREASE to Zepbound 7.5 mg - Inject 7.5 mg under the skin once weekly.  Prescription sent [facility].            Relevant Medications    tirzepatide, weight loss, (Zepbound) 7.5 mg/0.5 mL injection    Other Relevant Orders    Referral to Clinical Pharmacy             Clinical Pharmacist follow-up: 4 weeks, Telehealth visit    Continue all meds under the continuation of care with the referring provider and clinical pharmacy team.    Thank you,  Elle Cheatham  PGY-1 Pharmacy Resident   [phone]    Verbal consent to manage patient's drug therapy was obtained from the patient. They were informed they may decline to participate or withdraw from participation in pharmacy services at any time.        "

## 2025-03-13 NOTE — ASSESSMENT & PLAN NOTE
Patient was referred by her PCP to the clinical pharmacy team for weight loss management.  Patient is currently on Zepbound 5 mg has lost a total of 28 lbs. Patient reported minimal nausea the day after injection, but states it is bearable. Patient reported that weight loss has slowed down and discussed with patient increasing Zepbound dose. Patient was agreeable.     PLAN  INCREASE to Zepbound 7.5 mg - Inject 7.5 mg under the skin once weekly.  Prescription sent [facility].

## 2025-03-13 NOTE — PROGRESS NOTES
I reviewed the progress note and agree with the resident’s findings and plans as written. Case discussed with resident.    Moses Hargrove, PharmD

## 2025-04-09 ENCOUNTER — OFFICE VISIT (OUTPATIENT)
Dept: PRIMARY CARE | Facility: CLINIC | Age: 60
End: 2025-04-09
Payer: COMMERCIAL

## 2025-04-09 VITALS
TEMPERATURE: 97.5 F | OXYGEN SATURATION: 95 % | SYSTOLIC BLOOD PRESSURE: 114 MMHG | RESPIRATION RATE: 14 BRPM | HEIGHT: 64 IN | WEIGHT: 198.4 LBS | DIASTOLIC BLOOD PRESSURE: 80 MMHG | BODY MASS INDEX: 33.87 KG/M2 | HEART RATE: 70 BPM

## 2025-04-09 DIAGNOSIS — J21.9 ACUTE BRONCHIOLITIS DUE TO UNSPECIFIED ORGANISM: Primary | ICD-10-CM

## 2025-04-09 PROCEDURE — 99213 OFFICE O/P EST LOW 20 MIN: CPT | Performed by: FAMILY MEDICINE

## 2025-04-09 PROCEDURE — 3008F BODY MASS INDEX DOCD: CPT | Performed by: FAMILY MEDICINE

## 2025-04-09 RX ORDER — PREDNISONE 10 MG/1
TABLET ORAL
Qty: 21 TABLET | Refills: 0 | Status: SHIPPED | OUTPATIENT
Start: 2025-04-09 | End: 2025-04-15

## 2025-04-09 RX ORDER — AZITHROMYCIN 250 MG/1
TABLET, FILM COATED ORAL
Qty: 6 TABLET | Refills: 1 | Status: SHIPPED | OUTPATIENT
Start: 2025-04-09 | End: 2025-04-14

## 2025-04-09 NOTE — PROGRESS NOTES
"Subjective   Patient ID: Arlette Cedeño is a 59 y.o. female who presents for Sinusitis, Cough, Ear Fullness, and Headache (All sxs x approx 2 weeks).      HPI     URI: The patient is present today with current symptoms of nasal blockage, post nasal drip, productive cough, and sinus and nasal congestion. Symptoms began 2 weeks ago. Patient is using OTC cold medications and/or inhalers. They are drinking fluids and resting as much as possible. Symptoms are not improving. Imaging: was not done.     The patient denies any changes in vision, hearing or dental.     The patient maintains they do not have any chest pain, chest tightness or shortness of breath.    They do not experience nausea, emesis, changes in bowel movements or dyspepsia.    The patient's vaccinations are up to date.    Review of Systems   Constitutional: Negative.    HENT: Negative.  Negative for dental problem and hearing loss.    Eyes: Negative.  Negative for visual disturbance.   Respiratory: Negative.  Negative for chest tightness and shortness of breath.    Cardiovascular: Negative.  Negative for chest pain.   Gastrointestinal: Negative.  Negative for constipation, diarrhea, nausea and vomiting.   Endocrine: Negative.    Genitourinary: Negative.    Musculoskeletal: Negative.    Skin: Negative.    Allergic/Immunologic: Negative.    Neurological: Negative.    Hematological: Negative.    Psychiatric/Behavioral: Negative.     14/14 systems reviewed and negative other than what is listed in the history of present illness     Objective   /80 (BP Location: Left arm, Patient Position: Sitting, BP Cuff Size: Large adult)   Pulse 70   Temp 36.4 °C (97.5 °F) (Temporal)   Resp 14   Ht 1.626 m (5' 4\")   Wt 90 kg (198 lb 6.4 oz)   SpO2 95%   BMI 34.06 kg/m²     Physical Exam    Physical Exam  Constitutional:       Appearance: Normal appearance.   HENT:      Head: Normocephalic and atraumatic.      Nose: Nose normal.   Eyes:      Extraocular " Movements: Extraocular movements intact.      Conjunctiva/sclera: Conjunctivae normal.      Pupils: Pupils are equal, round, and reactive to light.   Cardiovascular:      Rate and Rhythm: Normal rate and regular rhythm.      Pulses: Normal pulses.      Heart sounds: Normal heart sounds.   Pulmonary:      Effort: Pulmonary effort is normal.      Breath sounds: Normal breath sounds.   Abdominal:      General: Bowel sounds are normal.      Palpations: Abdomen is soft.   Musculoskeletal:         General: Normal range of motion.      Cervical back: Normal range of motion and neck supple.   Skin:     General: Skin is warm.   Neurological:      Mental Status: She is alert and oriented to person, place, and time.   Psychiatric:         Mood and Affect: Mood normal.         Behavior: Behavior normal.         Thought Content: Thought content normal.         Judgment: Judgment normal.           Assessment/Plan     1. Acute bronchiolitis due to unspecified organism  azithromycin (Zithromax) 250 mg tablet    predniSONE (Deltasone) 10 mg tablet        1.  Acute bronchitis with history of asthma    You been dealing with symptoms for 2 weeks.    Please take Z-John take as directed for 5 days.  If symptoms have not completely cleared please start on prednisone.    I did give you refill the Z-John as well in case she would need it    Please stay well-hydrated    1 large tablespoon of honey every 4-6 hours as needed for coughing    Continue using your Dulera 2 puffs twice a day and use the albuterol inhaler 2 inhalations every 4-6 hours as needed for coughing    If you should worsen please call    Please keep all future appointments.    Follow-up in 6 months or sooner if there are any concerns.    Scribe Attestation  By signing my name below, IKristin Scribe   attest that this documentation has been prepared under the direction and in the presence of Ramakrishna Osborne MD.    This note has been transcribed using a medical scribe and  there is a possibility of unintentional typing misprints.

## 2025-04-09 NOTE — PATIENT INSTRUCTIONS
1.  Acute bronchitis with history of asthma    You been dealing with symptoms for 2 weeks.    Please take Z-John take as directed for 5 days.  If symptoms have not completely cleared please start on prednisone.    I did give you refill the Z-John as well in case she would need it    Please stay well-hydrated    1 large tablespoon of honey every 4-6 hours as needed for coughing    Continue using your Dulera 2 puffs twice a day and use the albuterol inhaler 2 inhalations every 4-6 hours as needed for coughing    If you should worsen please call    Please keep all future appointments.

## 2025-04-10 ENCOUNTER — APPOINTMENT (OUTPATIENT)
Dept: PHARMACY | Facility: HOSPITAL | Age: 60
End: 2025-04-10
Payer: COMMERCIAL

## 2025-04-10 DIAGNOSIS — E66.812 CLASS 2 SEVERE OBESITY WITH SERIOUS COMORBIDITY AND BODY MASS INDEX (BMI) OF 39.0 TO 39.9 IN ADULT, UNSPECIFIED OBESITY TYPE: ICD-10-CM

## 2025-04-10 DIAGNOSIS — E66.01 CLASS 2 SEVERE OBESITY WITH SERIOUS COMORBIDITY AND BODY MASS INDEX (BMI) OF 39.0 TO 39.9 IN ADULT, UNSPECIFIED OBESITY TYPE: ICD-10-CM

## 2025-04-10 PROBLEM — J21.9 ACUTE BRONCHIOLITIS: Status: ACTIVE | Noted: 2025-04-10

## 2025-04-10 NOTE — ASSESSMENT & PLAN NOTE
Patient was referred by her PCP to the clinical pharmacy team for weight loss management.  Patient is currently on Zepbound 7.5 mg has lost a total of 37 lbs. Patient reported minimal nausea and diarrhea the day after injection, but states it is bearable. Patient reported that weight loss is consistent and discussed with patient continuing Zepbound dose. Patient was agreeable.     PLAN  CONTINUE Zepbound 7.5 mg - Inject 7.5 mg under the skin once weekly.  Prescription sent [facility].

## 2025-04-10 NOTE — PROGRESS NOTES
Clinical Pharmacy Appointment    Patient ID: Arlette Cedeño is a 59 y.o. female who presents for weight loss management.     Pt is here for Follow Up appointment.     Referring Provider: Callie Lemus DO  PCP: Meghan Holland DO   Last visit with PCP: 11/22/2024   Next visit with PCP: 6/10/2025      Subjective     WEIGHT LOSS  BMI Readings from Last 3 Encounters:   04/09/25 34.06 kg/m²   11/22/24 39.74 kg/m²   04/23/24 39.36 kg/m²      Starting weight: 231 lbs.   Current weight: 194 lbs  Goal: 140 lbs    Current  Pharmacotherapy:   Zepbound 7.5 mg   Reports nausea and diarrhea - bareable     Non-Pharmacological Therapy  Weight loss techniques attempted:  Self directed dieting    Relevant PMH:  PMH of Pancreatitis? none  PMH of Retinopathy? none  PMH of MTC? none    Insurance coverage of weight-loss medications? No, PA was approved on 12/19/24.   Eligible for eriQooay cards/programs? Yes  Eligible for  PAP? No, reports income >400% FPL    Medication Estimated Cost Expected weight loss Patient Risks/Cautions Notes   Wegovy (semaglutide) ~$650/month w/ savings card 10-20% None      Zepbound (tirzepatide) $650/month   w/ savings card 10-20%     Qsymia (phentermine-topiramate) $98/month via Qsymia Engage 5-10% None Controlled substance   Contrave (bupropion-naltrexone) $99/month   via [employer]Access 5-10% None    Adipex (phentermine) ~$15/month 3-5% None Controlled substance,  Short-term use only   Navarro (OTC Orlistat) ~$60/month 3-5%  Adverse effects likely outweigh benefit.        Medication Reconciliation:  Changed: none  Added: none  Discontinued: Clindamycin 1%  lotion     Drug Interactions  No relevant drug interactions were noted.    Medication System Management  Patient's preferred pharmacy: [facility] - [place], [place]  Adherence/Organization: n/a  Affordability/Accessibility: n/a      Objective   Allergies   Allergen Reactions    Cat Hair Standardized Allergenic Extract Shortness of breath     Amoxicillin Unknown    Doxycycline Photosensitivity    Garlic Other     Upset stomach    Metformin GI Upset     Social History     Social History Narrative    Not on file      Medication Review  Current Outpatient Medications   Medication Instructions    albuterol 90 mcg/actuation inhaler USE 2 INHALATIONS SPACED 60 SECONDS APART EVERY 4 TO 6 HOURS    azithromycin (Zithromax) 250 mg tablet Take 2 tablets (500 mg) by mouth once daily for 1 day, THEN 1 tablet (250 mg) once daily for 4 days. Take 2 tabs (500 mg) by mouth today, than 1 daily for 4 days..    benzoyl peroxide 5 % external wash 1 Application, Every Day    buPROPion XL (WELLBUTRIN XL) 150 mg, oral, Every morning, DO NOT CRUSH CHEW OR SPLIT    calcitriol (ROCALTROL) 0.25 mcg, oral, 5 times daily    clindamycin (Cleocin T) 1 % lotion Apply pea sized amount to affected area once daily    ergocalciferol (VITAMIN D-2) 1,250 mcg, oral, Once Weekly    FLUoxetine (PROZAC) 20 mg, oral, Daily    levothyroxine (SYNTHROID, LEVOXYL) 125 mcg, oral, Daily    mometasone-formoterol (Dulera) 200-5 mcg/actuation inhaler 2 puffs, inhalation, 2 times daily    montelukast (SINGULAIR) 10 mg, oral, Daily    predniSONE (Deltasone) 10 mg tablet Take 6 tablets (60 mg) by mouth once daily for 1 day, THEN 5 tablets (50 mg) once daily for 1 day, THEN 4 tablets (40 mg) once daily for 1 day, THEN 3 tablets (30 mg) once daily for 1 day, THEN 2 tablets (20 mg) once daily for 1 day, THEN 1 tablet (10 mg) once daily for 1 day.    rosuvastatin (CRESTOR) 10 mg, oral, Daily    tirzepatide (weight loss) (ZEPBOUND) 7.5 mg, subcutaneous, Every 7 days    triamcinolone (Kenalog) 0.1 % cream 1 Application, Topical, 2 times daily      Vitals  BP Readings from Last 2 Encounters:   04/09/25 114/80   11/22/24 116/72     BMI Readings from Last 1 Encounters:   04/09/25 34.06 kg/m²      Labs  A1C  Lab Results   Component Value Date    HGBA1C 6.1 (H) 07/09/2024    HGBA1C 6.0 04/23/2024    HGBA1C 6.0 (H)  "12/11/2023     BMP  Lab Results   Component Value Date    CALCIUM 7.9 (L) 07/09/2024     07/09/2024    K 3.8 07/09/2024    CO2 31 07/09/2024     07/09/2024    BUN 11 07/09/2024    CREATININE 0.77 07/09/2024    EGFR 90 07/09/2024     LFTs  Lab Results   Component Value Date    ALT 33 07/09/2024    AST 22 07/09/2024    ALKPHOS 42 07/09/2024    BILITOT 0.5 07/09/2024     FLP  Lab Results   Component Value Date    TRIG 164 (H) 07/09/2024    CHOL 240 (H) 07/09/2024    LDLF 140 (H) 04/09/2021    LDLCALC 163 (H) 07/09/2024    HDL 44.1 07/09/2024     Urine Microalbumin  No results found for: \"MICROALBCREA\"  Weight Management  Wt Readings from Last 3 Encounters:   04/09/25 90 kg (198 lb 6.4 oz)   11/22/24 105 kg (231 lb 8 oz)   04/23/24 104 kg (229 lb 4.8 oz)      There is no height or weight on file to calculate BMI.     Assessment/Plan   Problem List Items Addressed This Visit       Class 2 severe obesity with serious comorbidity and body mass index (BMI) of 39.0 to 39.9 in adult     Patient was referred by her PCP to the clinical pharmacy team for weight loss management.  Patient is currently on Zepbound 7.5 mg has lost a total of 37 lbs. Patient reported minimal nausea and diarrhea the day after injection, but states it is bearable. Patient reported that weight loss is consistent and discussed with patient continuing Zepbound dose. Patient was agreeable.     PLAN  CONTINUE Zepbound 7.5 mg - Inject 7.5 mg under the skin once weekly.  Prescription sent [facility].            Relevant Medications    tirzepatide, weight loss, (Zepbound) 7.5 mg/0.5 mL injection    Other Relevant Orders    Referral to Clinical Pharmacy       Clinical Pharmacist follow-up: 4 weeks, Telehealth visit    Continue all meds under the continuation of care with the referring provider and clinical pharmacy team.    Thank you,  Elle Cheatham  PGY-1 Pharmacy Resident   [phone]    Verbal consent to manage patient's drug therapy was " obtained from the patient. They were informed they may decline to participate or withdraw from participation in pharmacy services at any time.

## 2025-05-08 ENCOUNTER — APPOINTMENT (OUTPATIENT)
Dept: PHARMACY | Facility: HOSPITAL | Age: 60
End: 2025-05-08
Payer: COMMERCIAL

## 2025-05-08 DIAGNOSIS — E66.812 CLASS 2 SEVERE OBESITY WITH SERIOUS COMORBIDITY AND BODY MASS INDEX (BMI) OF 39.0 TO 39.9 IN ADULT, UNSPECIFIED OBESITY TYPE: ICD-10-CM

## 2025-05-08 DIAGNOSIS — E66.01 CLASS 2 SEVERE OBESITY WITH SERIOUS COMORBIDITY AND BODY MASS INDEX (BMI) OF 39.0 TO 39.9 IN ADULT, UNSPECIFIED OBESITY TYPE: ICD-10-CM

## 2025-05-08 NOTE — PROGRESS NOTES
Clinical Pharmacy Appointment    Patient ID: Arlette Cedeño is a 59 y.o. female who presents for weight loss management.     Pt is here for Follow Up appointment.     Referring Provider: Callie Lemus DO  PCP: Callie Lemus DO   Last visit with PCP: 11/22/2024   Next visit with PCP: 6/10/2025      Subjective     WEIGHT LOSS  BMI Readings from Last 3 Encounters:   04/09/25 34.06 kg/m²   11/22/24 39.74 kg/m²   04/23/24 39.36 kg/m²      Starting weight: 231 lbs.   Current weight: 190 lbs  Goal: 140 lbs    Current  Pharmacotherapy:   Zepbound 7.5 mg   Reports nausea and diarrhea - bareable     Non-Pharmacological Therapy  Weight loss techniques attempted:  Self directed dieting    Relevant PMH:  PMH of Pancreatitis? none  PMH of Retinopathy? none  PMH of MTC? none    Insurance coverage of weight-loss medications? No, PA was approved on 12/19/24.   Eligible for [employer] cards/programs? Yes  Eligible for  PAP? No, reports income >400% FPL    Medication Estimated Cost Expected weight loss Patient Risks/Cautions Notes   Wegovy (semaglutide) ~$650/month w/ savings card 10-20% None      Zepbound (tirzepatide) $650/month   w/ savings card 10-20%     Qsymia (phentermine-topiramate) $98/month via Qsymia Engage 5-10% None Controlled substance   Contrave (bupropion-naltrexone) $99/month   via CurAccess 5-10% None    Adipex (phentermine) ~$15/month 3-5% None Controlled substance,  Short-term use only   Navarro (OTC Orlistat) ~$60/month 3-5%  Adverse effects likely outweigh benefit.        Medication Reconciliation:  Changed: none  Added: none  Discontinued: Clindamycin 1%  lotion     Drug Interactions  No relevant drug interactions were noted.    Medication System Management  Patient's preferred pharmacy: [facility] - [place], [place]  Adherence/Organization: n/a  Affordability/Accessibility: n/a      Objective   Allergies   Allergen Reactions    Cat Hair Standardized Allergenic Extract Shortness of breath     Amoxicillin Unknown    Doxycycline Photosensitivity    Garlic Other     Upset stomach    Metformin GI Upset     Social History     Social History Narrative    Not on file      Medication Review  Current Outpatient Medications   Medication Instructions    albuterol 90 mcg/actuation inhaler USE 2 INHALATIONS SPACED 60 SECONDS APART EVERY 4 TO 6 HOURS    benzoyl peroxide 5 % external wash 1 Application, Every Day    buPROPion XL (WELLBUTRIN XL) 150 mg, oral, Every morning, DO NOT CRUSH CHEW OR SPLIT    calcitriol (ROCALTROL) 0.25 mcg, oral, 5 times daily    ergocalciferol (VITAMIN D-2) 1,250 mcg, oral, Once Weekly    FLUoxetine (PROZAC) 20 mg, oral, Daily    levothyroxine (SYNTHROID, LEVOXYL) 125 mcg, oral, Daily    mometasone-formoterol (Dulera) 200-5 mcg/actuation inhaler 2 puffs, inhalation, 2 times daily    montelukast (SINGULAIR) 10 mg, oral, Daily    rosuvastatin (CRESTOR) 10 mg, oral, Daily    tirzepatide (weight loss) (ZEPBOUND) 7.5 mg, subcutaneous, Every 7 days    triamcinolone (Kenalog) 0.1 % cream 1 Application, Topical, 2 times daily      Vitals  BP Readings from Last 2 Encounters:   04/09/25 114/80   11/22/24 116/72     BMI Readings from Last 1 Encounters:   04/09/25 34.06 kg/m²      Labs  A1C  Lab Results   Component Value Date    HGBA1C 6.1 (H) 07/09/2024    HGBA1C 6.0 04/23/2024    HGBA1C 6.0 (H) 12/11/2023     BMP  Lab Results   Component Value Date    CALCIUM 7.9 (L) 07/09/2024     07/09/2024    K 3.8 07/09/2024    CO2 31 07/09/2024     07/09/2024    BUN 11 07/09/2024    CREATININE 0.77 07/09/2024    EGFR 90 07/09/2024     LFTs  Lab Results   Component Value Date    ALT 33 07/09/2024    AST 22 07/09/2024    ALKPHOS 42 07/09/2024    BILITOT 0.5 07/09/2024     FLP  Lab Results   Component Value Date    TRIG 164 (H) 07/09/2024    CHOL 240 (H) 07/09/2024    LDLF 140 (H) 04/09/2021    LDLCALC 163 (H) 07/09/2024    HDL 44.1 07/09/2024     Urine Microalbumin  No results found for:  "\"MICROALBCREA\"  Weight Management  Wt Readings from Last 3 Encounters:   04/09/25 90 kg (198 lb 6.4 oz)   11/22/24 105 kg (231 lb 8 oz)   04/23/24 104 kg (229 lb 4.8 oz)      There is no height or weight on file to calculate BMI.     Assessment/Plan   Problem List Items Addressed This Visit       Class 2 severe obesity with serious comorbidity and body mass index (BMI) of 39.0 to 39.9 in adult    Patient was referred by her PCP to the clinical pharmacy team for weight loss management.  Patient is currently on Zepbound 7.5 mg has lost a total of 41 lbs. Patient reported minimal nausea and diarrhea the day after injection, but states it is bearable. Discussed with patient continuing Zepbound dose as patient experiencing GI side effects.  Patient was agreeable.     PLAN  CONTINUE Zepbound 7.5 mg - Inject 7.5 mg under the skin once weekly.  Prescription sent [facility].            Relevant Medications    tirzepatide, weight loss, (Zepbound) 7.5 mg/0.5 mL injection    Other Relevant Orders    Referral to Clinical Pharmacy       Clinical Pharmacist follow-up: 4 weeks, Telehealth visit    Continue all meds under the continuation of care with the referring provider and clinical pharmacy team.    Thank you,  Elle Cheatham  PGY-1 Pharmacy Resident   [phone]    Verbal consent to manage patient's drug therapy was obtained from the patient. They were informed they may decline to participate or withdraw from participation in pharmacy services at any time.        "

## 2025-05-08 NOTE — ASSESSMENT & PLAN NOTE
Patient was referred by her PCP to the clinical pharmacy team for weight loss management.  Patient is currently on Zepbound 7.5 mg has lost a total of 41 lbs. Patient reported minimal nausea and diarrhea the day after injection, but states it is bearable. Discussed with patient continuing Zepbound dose as patient experiencing GI side effects.  Patient was agreeable.     PLAN  CONTINUE Zepbound 7.5 mg - Inject 7.5 mg under the skin once weekly.  Prescription sent [facility].

## 2025-06-02 ENCOUNTER — APPOINTMENT (OUTPATIENT)
Dept: PHARMACY | Facility: HOSPITAL | Age: 60
End: 2025-06-02
Payer: COMMERCIAL

## 2025-06-02 DIAGNOSIS — E66.812 CLASS 2 SEVERE OBESITY WITH SERIOUS COMORBIDITY AND BODY MASS INDEX (BMI) OF 39.0 TO 39.9 IN ADULT, UNSPECIFIED OBESITY TYPE: ICD-10-CM

## 2025-06-02 DIAGNOSIS — E66.01 CLASS 2 SEVERE OBESITY WITH SERIOUS COMORBIDITY AND BODY MASS INDEX (BMI) OF 39.0 TO 39.9 IN ADULT, UNSPECIFIED OBESITY TYPE: ICD-10-CM

## 2025-06-02 NOTE — PROGRESS NOTES
Clinical Pharmacy Appointment    Patient ID: Arlette Cedeño is a 59 y.o. female who presents for weight loss management.     Pt is here for Follow Up appointment.     Referring Provider: Callie Lemus DO  PCP: Callie Lemus DO   Last visit with PCP: 11/22/2024   Next visit with PCP: 6/10/2025      Subjective     WEIGHT LOSS  BMI Readings from Last 3 Encounters:   04/09/25 34.06 kg/m²   11/22/24 39.74 kg/m²   04/23/24 39.36 kg/m²      Starting weight: 231 lbs.   Current weight: 185 lbs  Goal: 140 lbs    Current  Pharmacotherapy:   Zepbound 7.5 mg   Reports nausea and diarrhea - bareable     Non-Pharmacological Therapy  Weight loss techniques attempted:  Self directed dieting    Relevant PMH:  PMH of Pancreatitis? none  PMH of Retinopathy? none  PMH of MTC? none    Insurance coverage of weight-loss medications? No, PA was approved on 12/19/24.   Eligible for CREAT cards/programs? Yes  Eligible for  PAP? No, reports income >400% FPL    Medication Estimated Cost Expected weight loss Patient Risks/Cautions Notes   Wegovy (semaglutide) ~$650/month w/ savings card 10-20% None      Zepbound (tirzepatide) $650/month   w/ savings card 10-20%     Qsymia (phentermine-topiramate) $98/month via Qsymia Engage 5-10% None Controlled substance   Contrave (bupropion-naltrexone) $99/month   via CurAccess 5-10% None    Adipex (phentermine) ~$15/month 3-5% None Controlled substance,  Short-term use only   Navarro (OTC Orlistat) ~$60/month 3-5%  Adverse effects likely outweigh benefit.     Drug Interactions  No relevant drug interactions were noted.    Medication System Management  Patient's preferred pharmacy: [facility] - [place]  Adherence/Organization: n/a  Affordability/Accessibility: n/a      Objective   Allergies   Allergen Reactions    Cat Hair Standardized Allergenic Extract Shortness of breath    Amoxicillin Unknown    Doxycycline Photosensitivity    Garlic Other     Upset stomach    Metformin GI Upset  "    Social History     Social History Narrative    Not on file      Medication Review  Current Outpatient Medications   Medication Instructions    albuterol 90 mcg/actuation inhaler USE 2 INHALATIONS SPACED 60 SECONDS APART EVERY 4 TO 6 HOURS    benzoyl peroxide 5 % external wash 1 Application, Every Day    buPROPion XL (WELLBUTRIN XL) 150 mg, oral, Every morning, DO NOT CRUSH CHEW OR SPLIT    calcitriol (ROCALTROL) 0.25 mcg, oral, 5 times daily    ergocalciferol (VITAMIN D-2) 1,250 mcg, oral, Once Weekly    FLUoxetine (PROZAC) 20 mg, oral, Daily    levothyroxine (SYNTHROID, LEVOXYL) 125 mcg, oral, Daily    mometasone-formoterol (Dulera) 200-5 mcg/actuation inhaler 2 puffs, inhalation, 2 times daily    montelukast (SINGULAIR) 10 mg, oral, Daily    rosuvastatin (CRESTOR) 10 mg, oral, Daily    tirzepatide (weight loss) (ZEPBOUND) 7.5 mg, subcutaneous, Every 7 days    triamcinolone (Kenalog) 0.1 % cream 1 Application, Topical, 2 times daily      Vitals  BP Readings from Last 2 Encounters:   04/09/25 114/80   11/22/24 116/72     BMI Readings from Last 1 Encounters:   04/09/25 34.06 kg/m²      Labs  A1C  Lab Results   Component Value Date    HGBA1C 6.1 (H) 07/09/2024    HGBA1C 6.0 04/23/2024    HGBA1C 6.0 (H) 12/11/2023     BMP  Lab Results   Component Value Date    CALCIUM 7.9 (L) 07/09/2024     07/09/2024    K 3.8 07/09/2024    CO2 31 07/09/2024     07/09/2024    BUN 11 07/09/2024    CREATININE 0.77 07/09/2024    EGFR 90 07/09/2024     LFTs  Lab Results   Component Value Date    ALT 33 07/09/2024    AST 22 07/09/2024    ALKPHOS 42 07/09/2024    BILITOT 0.5 07/09/2024     FLP  Lab Results   Component Value Date    TRIG 164 (H) 07/09/2024    CHOL 240 (H) 07/09/2024    LDLF 140 (H) 04/09/2021    LDLCALC 163 (H) 07/09/2024    HDL 44.1 07/09/2024     Urine Microalbumin  No results found for: \"MICROALBCREA\"  Weight Management  Wt Readings from Last 3 Encounters:   04/09/25 90 kg (198 lb 6.4 oz)   11/22/24 105 kg " (231 lb 8 oz)   04/23/24 104 kg (229 lb 4.8 oz)      There is no height or weight on file to calculate BMI.     Assessment/Plan   Problem List Items Addressed This Visit       Class 2 severe obesity with serious comorbidity and body mass index (BMI) of 39.0 to 39.9 in adult    Patient was referred by her PCP to the clinical pharmacy team for weight loss management.  Patient is currently on Zepbound 7.5 mg has lost a total of 52 lbs. Patient reported minimal nausea and diarrhea the day after injection, but states it is tolerable. Discussed with patient continuing Zepbound dose as patient experiencing GI side effects.  Patient was agreeable.     PLAN  CONTINUE Zepbound 7.5 mg - Inject 7.5 mg under the skin once weekly.  Prescription sent [facility].          Relevant Medications    tirzepatide, weight loss, (Zepbound) 7.5 mg/0.5 mL injection    Other Relevant Orders    Referral to Clinical Pharmacy     Clinical Pharmacist follow-up: 4 weeks, Telehealth visit    Continue all meds under the continuation of care with the referring provider and clinical pharmacy team.    Thank you,  Kaitlynn Reese, PharmD  Clinical Pharmacist   [phone]    Verbal consent to manage patient's drug therapy was obtained from the patient. They were informed they may decline to participate or withdraw from participation in pharmacy services at any time.

## 2025-06-02 NOTE — ASSESSMENT & PLAN NOTE
Patient was referred by her PCP to the clinical pharmacy team for weight loss management.  Patient is currently on Zepbound 7.5 mg has lost a total of 52 lbs. Patient reported minimal nausea and diarrhea the day after injection, but states it is tolerable. Discussed with patient continuing Zepbound dose as patient experiencing GI side effects.  Patient was agreeable.     PLAN  CONTINUE Zepbound 7.5 mg - Inject 7.5 mg under the skin once weekly.  Prescription sent [facility].

## 2025-06-10 ENCOUNTER — APPOINTMENT (OUTPATIENT)
Dept: PRIMARY CARE | Facility: CLINIC | Age: 60
End: 2025-06-10
Payer: COMMERCIAL

## 2025-06-10 VITALS
HEART RATE: 80 BPM | DIASTOLIC BLOOD PRESSURE: 67 MMHG | OXYGEN SATURATION: 97 % | WEIGHT: 180.6 LBS | SYSTOLIC BLOOD PRESSURE: 98 MMHG | HEIGHT: 64 IN | BODY MASS INDEX: 30.83 KG/M2 | TEMPERATURE: 98 F

## 2025-06-10 DIAGNOSIS — E89.2 HYPOPARATHYROIDISM AFTER PROCEDURE (MULTI): ICD-10-CM

## 2025-06-10 DIAGNOSIS — E55.9 VITAMIN D DEFICIENCY: ICD-10-CM

## 2025-06-10 DIAGNOSIS — E66.812 CLASS 2 SEVERE OBESITY DUE TO EXCESS CALORIES WITH SERIOUS COMORBIDITY AND BODY MASS INDEX (BMI) OF 39.0 TO 39.9 IN ADULT: ICD-10-CM

## 2025-06-10 DIAGNOSIS — F32.A ANXIETY AND DEPRESSION: ICD-10-CM

## 2025-06-10 DIAGNOSIS — K76.0 FATTY LIVER: ICD-10-CM

## 2025-06-10 DIAGNOSIS — J45.909 MILD ASTHMA, UNSPECIFIED WHETHER COMPLICATED, UNSPECIFIED WHETHER PERSISTENT (HHS-HCC): ICD-10-CM

## 2025-06-10 DIAGNOSIS — R41.840 INATTENTION: ICD-10-CM

## 2025-06-10 DIAGNOSIS — Z12.31 BREAST CANCER SCREENING BY MAMMOGRAM: Primary | ICD-10-CM

## 2025-06-10 DIAGNOSIS — E66.01 CLASS 2 SEVERE OBESITY DUE TO EXCESS CALORIES WITH SERIOUS COMORBIDITY AND BODY MASS INDEX (BMI) OF 39.0 TO 39.9 IN ADULT: ICD-10-CM

## 2025-06-10 DIAGNOSIS — F41.9 ANXIETY AND DEPRESSION: ICD-10-CM

## 2025-06-10 DIAGNOSIS — E89.2 S/P TOTAL PARATHYROIDECTOMY (CMS/HCC): ICD-10-CM

## 2025-06-10 DIAGNOSIS — R53.83 OTHER FATIGUE: ICD-10-CM

## 2025-06-10 DIAGNOSIS — I25.10 CORONARY ARTERY DISEASE INVOLVING NATIVE CORONARY ARTERY OF NATIVE HEART WITHOUT ANGINA PECTORIS: ICD-10-CM

## 2025-06-10 PROCEDURE — 1036F TOBACCO NON-USER: CPT | Performed by: STUDENT IN AN ORGANIZED HEALTH CARE EDUCATION/TRAINING PROGRAM

## 2025-06-10 PROCEDURE — 99214 OFFICE O/P EST MOD 30 MIN: CPT | Performed by: STUDENT IN AN ORGANIZED HEALTH CARE EDUCATION/TRAINING PROGRAM

## 2025-06-10 PROCEDURE — 3008F BODY MASS INDEX DOCD: CPT | Performed by: STUDENT IN AN ORGANIZED HEALTH CARE EDUCATION/TRAINING PROGRAM

## 2025-06-10 RX ORDER — FLUOXETINE 20 MG/1
20 CAPSULE ORAL DAILY
Qty: 90 CAPSULE | Refills: 3 | Status: SHIPPED | OUTPATIENT
Start: 2025-06-10 | End: 2026-06-10

## 2025-06-10 RX ORDER — CALCITRIOL 0.25 UG/1
0.25 CAPSULE ORAL 4 TIMES DAILY
Qty: 360 CAPSULE | Refills: 3 | Status: SHIPPED | OUTPATIENT
Start: 2025-06-10

## 2025-06-10 RX ORDER — BUPROPION HYDROCHLORIDE 150 MG/1
150 TABLET ORAL DAILY PRN
Qty: 90 TABLET | Refills: 3 | Status: SHIPPED | OUTPATIENT
Start: 2025-06-10

## 2025-06-10 RX ORDER — MONTELUKAST SODIUM 10 MG/1
10 TABLET ORAL DAILY
Qty: 90 TABLET | Refills: 3 | Status: SHIPPED | OUTPATIENT
Start: 2025-06-10

## 2025-06-10 RX ORDER — ERGOCALCIFEROL 1.25 MG/1
1.25 CAPSULE ORAL
Qty: 12 CAPSULE | Refills: 3 | Status: SHIPPED | OUTPATIENT
Start: 2025-06-10

## 2025-06-10 RX ORDER — MOMETASONE FUROATE AND FORMOTEROL FUMARATE DIHYDRATE 200; 5 UG/1; UG/1
2 AEROSOL RESPIRATORY (INHALATION) 2 TIMES DAILY
Qty: 39 G | Refills: 3 | Status: SHIPPED | OUTPATIENT
Start: 2025-06-10

## 2025-06-10 RX ORDER — ALBUTEROL SULFATE 90 UG/1
2 INHALANT RESPIRATORY (INHALATION) EVERY 4 HOURS PRN
Qty: 25.5 G | Refills: 3 | Status: SHIPPED | OUTPATIENT
Start: 2025-06-10

## 2025-06-10 ASSESSMENT — PATIENT HEALTH QUESTIONNAIRE - PHQ9
6. FEELING BAD ABOUT YOURSELF - OR THAT YOU ARE A FAILURE OR HAVE LET YOURSELF OR YOUR FAMILY DOWN: NOT AT ALL
8. MOVING OR SPEAKING SO SLOWLY THAT OTHER PEOPLE COULD HAVE NOTICED. OR THE OPPOSITE, BEING SO FIGETY OR RESTLESS THAT YOU HAVE BEEN MOVING AROUND A LOT MORE THAN USUAL: NOT AT ALL
4. FEELING TIRED OR HAVING LITTLE ENERGY: NOT AT ALL
SUM OF ALL RESPONSES TO PHQ QUESTIONS 1-9: 0
7. TROUBLE CONCENTRATING ON THINGS, SUCH AS READING THE NEWSPAPER OR WATCHING TELEVISION: NOT AT ALL
SUM OF ALL RESPONSES TO PHQ9 QUESTIONS 1 AND 2: 0
2. FEELING DOWN, DEPRESSED OR HOPELESS: NOT AT ALL
9. THOUGHTS THAT YOU WOULD BE BETTER OFF DEAD, OR OF HURTING YOURSELF: NOT AT ALL
1. LITTLE INTEREST OR PLEASURE IN DOING THINGS: NOT AT ALL
3. TROUBLE FALLING OR STAYING ASLEEP OR SLEEPING TOO MUCH: NOT AT ALL
5. POOR APPETITE OR OVEREATING: NOT AT ALL

## 2025-06-10 ASSESSMENT — ANXIETY QUESTIONNAIRES
3. WORRYING TOO MUCH ABOUT DIFFERENT THINGS: NOT AT ALL
IF YOU CHECKED OFF ANY PROBLEMS ON THIS QUESTIONNAIRE, HOW DIFFICULT HAVE THESE PROBLEMS MADE IT FOR YOU TO DO YOUR WORK, TAKE CARE OF THINGS AT HOME, OR GET ALONG WITH OTHER PEOPLE: NOT DIFFICULT AT ALL
5. BEING SO RESTLESS THAT IT IS HARD TO SIT STILL: NOT AT ALL
7. FEELING AFRAID AS IF SOMETHING AWFUL MIGHT HAPPEN: NOT AT ALL
6. BECOMING EASILY ANNOYED OR IRRITABLE: NOT AT ALL
4. TROUBLE RELAXING: NOT AT ALL
GAD7 TOTAL SCORE: 0
2. NOT BEING ABLE TO STOP OR CONTROL WORRYING: NOT AT ALL
1. FEELING NERVOUS, ANXIOUS, OR ON EDGE: NOT AT ALL

## 2025-06-10 NOTE — PATIENT INSTRUCTIONS
VISIT SUMMARY:  Today, we reviewed your progress with weight management, medication regimen, and overall health. You have successfully lost 55 pounds since January, and we discussed the next steps for managing your prediabetes, cholesterol, and other health concerns.    YOUR PLAN:  -OBESITY: Obesity means having an excessive amount of body fat. You have lost 55 pounds since January and are currently on a 7.5 mg dose of your weight management medication. Continue with this dose and monitor your weight. We will adjust the medication as needed.    -PREDIABETES: Prediabetes is a condition where blood sugar levels are higher than normal but not yet high enough to be diagnosed as diabetes. We will order an A1c test to assess the impact of your weight loss on your blood sugar levels.    -HYPERLIPIDEMIA: Hyperlipidemia means having high levels of fats (lipids) in your blood, such as cholesterol. You are on Crestor to manage this. We will order a lipid panel to evaluate the effect of your weight loss on your cholesterol levels and adjust your Crestor dosage if needed.    -HYPOCALCEMIA: Hypocalcemia means having low levels of calcium in your blood. Your last calcium level was 8.4. We will order a calcium level test and continue your current dose of calcitriol four times a day.    -VITAMIN D DEFICIENCY: Vitamin D deficiency means having low levels of vitamin D, which is important for bone health. You are on a weekly vitamin D supplement, which you will likely need year-round. We will refill your vitamin D supplement.    -HYPOTHYROIDISM: Hypothyroidism means your thyroid gland is underactive and doesn't produce enough thyroid hormone. You are under the care of an endocrinologist and on levothyroxine. We will coordinate with your endocrinologist for managing your medication.    -DEPRESSION: Depression is a mood disorder that causes persistent feelings of sadness and loss of interest. You are on Prozac and use Wellbutrin as  needed. Continue with Prozac and use Wellbutrin when you feel particularly unwell.    -ASTHMA: Asthma is a condition in which your airways narrow and swell, producing extra mucus. Your asthma is well-controlled with Dulera, but you experience flares when sick. Continue using Dulera as prescribed.    -HEARING LOSS: Hearing loss means a partial or total inability to hear. You have reported hearing difficulties, possibly due to eczema and scarring in your eardrums. We will refer you to an ENT specialist for a hearing evaluation.    -GENERAL HEALTH MAINTENANCE: You are due for a mammogram and have completed a colonoscopy last year with a five-year follow-up recommended. We will order and schedule your mammogram and ensure all routine screenings are up to date.    INSTRUCTIONS:  Please follow up with your endocrinologist next week and complete the labs prior to your appointment. We will monitor your lab results and coordinate your care accordingly.

## 2025-06-10 NOTE — PROGRESS NOTES
FAMILY MEDICINE  OFFICE VISIT   Arlette Cedeño  [id number]  1965    PCP: Callie Lemus DO     Chief Complaint:   Chief Complaint   Patient presents with    Follow-up     Pt presents for 6 month follow up- pt states that she would like you to take a look in her ears for her.      SUBJECTIVE     Arlette Cedeño is a 59 y.o. English-speaking female, who presents to the clinic with complaints of follow-up.    History of Present Illness  Arlette Cedeño is a 59 year old female with prediabetes and obesity who presents for follow-up on weight management and medication review.    She has experienced significant weight loss since starting a new weight management program in January, losing 55 pounds. Initially, she lost about two pounds per week, but the rate has slowed to six pounds per month. She is currently on a 7.5 mg dose of her weight management medication and notes reduced food cravings.    She has a history of prediabetes and was previously close to a diabetes diagnosis. She is due for repeat labs to check her A1c and lipid levels. Her blood pressure has been low, which she considers a positive change.    Her medication regimen includes calcitriol four times a day, reduced from five times a day, and a weekly vitamin D supplement. Her last calcium level was low at 8.4. She also takes Wellbutrin and Prozac, using Wellbutrin as needed when feeling particularly unwell.    She has a history of thyroid issues and is currently under the care of an endocrinologist. She takes levothyroxine, which was recently managed by her new endocrinologist.    She experiences eczema in her ears and reports some hearing difficulties, which her  has noticed. She has a history of viral ear infections, and her eardrums appear scarred, potentially affecting her hearing.    She uses Dulera for asthma management, which has been stable except for occasional flares when she is sick. She also uses albuterol as needed, which is  obtained through [facility] due to insurance requirements.    Her family history includes a sister with breast cancer in her early fifties, a father with heart problems, and a mother who had Parkinson's disease. There is no family history of endometrial, uterine, ovarian, colon, pancreatic, prostate, or skin cancers.    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 She reports being 'always tired' but notes no change in fatigue levels since starting her weight management program. No new respiratory symptoms and reports stable asthma control with Dulera.    Weight   - Max 235 lbs   - Today 180     FamHx:   - Sister: breast ca (52)  - No to all other cancers.     Patient Health Questionnaire-9 Score: 0  MARITZA-7 Total Score: 0 (6/10/2025  2:00 PM)    HPI      The following portions of the patient's chart were reviewed in this encounter and updated as appropriate:  Tobacco  Allergies  Meds  Problems  Med Hx  Surg Hx  Fam Hx         Home Medication List:  Current Outpatient Medications   Medication Instructions    albuterol 90 mcg/actuation inhaler 2 puffs, inhalation, Every 4 hours PRN    benzoyl peroxide 5 % external wash 1 Application, Every Day    buPROPion XL (WELLBUTRIN XL) 150 mg, oral, Daily PRN    calcitriol (ROCALTROL) 0.25 mcg, oral, 4 times daily    ergocalciferol (VITAMIN D-2) 1.25 mg, oral, Once Weekly    FLUoxetine (PROZAC) 20 mg, oral, Daily    levothyroxine (SYNTHROID, LEVOXYL) 125 mcg, oral, Daily    mometasone-formoterol (Dulera) 200-5 mcg/actuation inhaler 2 puffs, inhalation, 2 times daily    montelukast (SINGULAIR) 10 mg, oral, Daily    rosuvastatin (CRESTOR) 10 mg, oral, Daily    tirzepatide (weight loss) (ZEPBOUND) 7.5 mg, subcutaneous, Every 7 days    triamcinolone (Kenalog) 0.1 % cream 1 Application, Topical, 2 times daily         OBJECTIVE   BP 98/67 (BP Location: Right arm, Patient Position: Sitting, BP Cuff Size: Adult)   Pulse 80   Temp 36.7 °C  "(98 °F) (Temporal)   Ht 1.626 m (5' 4\")   Wt 81.9 kg (180 lb 9.6 oz)   SpO2 97%   BMI 31.00 kg/m²   Vital signs and pulse oximetry reviewed.     Physical Exam  Vitals and nursing note reviewed.   Constitutional:       Appearance: Normal appearance.   HENT:      Head: Normocephalic and atraumatic.      Right Ear: Ear canal and external ear normal.      Left Ear: Ear canal and external ear normal.      Ears:      Comments: Eczema in the ear canal R>L, TM with scarring bilaterally.      Nose: Nose normal. No congestion or rhinorrhea.   Eyes:      General: No scleral icterus.     Conjunctiva/sclera: Conjunctivae normal.   Cardiovascular:      Rate and Rhythm: Normal rate and regular rhythm.      Heart sounds: No murmur heard.  Pulmonary:      Effort: Pulmonary effort is normal. No respiratory distress.      Breath sounds: Normal breath sounds. No wheezing, rhonchi or rales.   Musculoskeletal:      Cervical back: No rigidity or tenderness.      Right lower leg: No edema.      Left lower leg: No edema.   Lymphadenopathy:      Cervical: No cervical adenopathy.   Skin:     General: Skin is warm.      Coloration: Skin is not jaundiced.   Neurological:      Mental Status: She is alert. Mental status is at baseline.   Psychiatric:         Mood and Affect: Mood normal.         Behavior: Behavior normal.         Physical Exam  HEENT: Eczema present on ears, tympanic membrane has scars.  CARDIOVASCULAR: Regular heart sounds.    Results  LABS  Calcium: 8.4    DIAGNOSTIC  Colonoscopy: A couple of polyps removed, no problematic findings (08/2024)      ASSESSMENT & PLAN     Problem List Items Addressed This Visit       Anxiety and depression    Overview   Current regimen:  Prozac 40 mg daily, last increased 10/2023  Wellbutrin 150 mg daily, started 12/2023         Relevant Medications    buPROPion XL (Wellbutrin XL) 150 mg 24 hr tablet    FLUoxetine (PROzac) 20 mg capsule    Other Relevant Orders    CBC and Auto Differential    " Hemoglobin A1C    Lipid Panel    Comprehensive Metabolic Panel    Vitamin B12    Vitamin D 25-Hydroxy,Total (for eval of Vitamin D levels)    Asthma    Relevant Medications    albuterol 90 mcg/actuation inhaler    mometasone-formoterol (Dulera) 200-5 mcg/actuation inhaler    montelukast (Singulair) 10 mg tablet    Other Relevant Orders    CBC and Auto Differential    Hemoglobin A1C    Lipid Panel    Comprehensive Metabolic Panel    Vitamin B12    Vitamin D 25-Hydroxy,Total (for eval of Vitamin D levels)    Class 2 severe obesity with serious comorbidity and body mass index (BMI) of 39.0 to 39.9 in adult    Relevant Orders    CBC and Auto Differential    Hemoglobin A1C    Lipid Panel    Comprehensive Metabolic Panel    Vitamin B12    Vitamin D 25-Hydroxy,Total (for eval of Vitamin D levels)    Fatty liver    Relevant Orders    CBC and Auto Differential    Hemoglobin A1C    Lipid Panel    Comprehensive Metabolic Panel    Vitamin B12    Vitamin D 25-Hydroxy,Total (for eval of Vitamin D levels)    Follow Up In [facility] - PCP - Health Maintenance    Hypoparathyroidism after procedure (Multi)    Overview   Following with endo @ [facility] - Dr. España  s/p surgery to remove parathyroids and thyroid in 2013 2/2 thyroid carcinoma  Taking Calcitriol 0.25 mcg 4 tablets daily + Calcium Acetate 667 mg 2 tablets daily.         Relevant Medications    calcitriol (Rocaltrol) 0.25 mcg capsule    Other Relevant Orders    CBC and Auto Differential    Hemoglobin A1C    Lipid Panel    Comprehensive Metabolic Panel    Vitamin B12    Vitamin D 25-Hydroxy,Total (for eval of Vitamin D levels)    Follow Up In [facility] - PCP - Health Maintenance    Inattention    Relevant Medications    buPROPion XL (Wellbutrin XL) 150 mg 24 hr tablet    Other Relevant Orders    CBC and Auto Differential    Hemoglobin A1C    Lipid Panel    Comprehensive Metabolic Panel    Vitamin B12    Vitamin D 25-Hydroxy,Total (for eval of Vitamin D  levels)    S/P total parathyroidectomy (CMS/[facility])    Overview   Following with endo @ [facility] - Dr. España  s/p surgery to remove parathyroids and thyroid in 2013 2/2 thyroid carcinoma         Relevant Orders    CBC and Auto Differential    Hemoglobin A1C    Lipid Panel    Comprehensive Metabolic Panel    Vitamin B12    Vitamin D 25-Hydroxy,Total (for eval of Vitamin D levels)    Vitamin D deficiency    Relevant Medications    ergocalciferol (Vitamin D-2) 1250 mcg (50,000 units) capsule    Other Relevant Orders    CBC and Auto Differential    Hemoglobin A1C    Lipid Panel    Comprehensive Metabolic Panel    Vitamin B12    Vitamin D 25-Hydroxy,Total (for eval of Vitamin D levels)     Other Visit Diagnoses         Breast cancer screening by mammogram    -  Primary    Relevant Orders    BI mammo bilateral screening tomosynthesis      Coronary artery disease involving native coronary artery of native heart without angina pectoris        Relevant Orders    CBC and Auto Differential    Hemoglobin A1C    Lipid Panel    Comprehensive Metabolic Panel    Vitamin B12    Vitamin D 25-Hydroxy,Total (for eval of Vitamin D levels)    Follow Up In [facility] - PCP - Health Maintenance      Other fatigue        Relevant Orders    CBC and Auto Differential    Hemoglobin A1C    Lipid Panel    Comprehensive Metabolic Panel    Vitamin B12    Vitamin D 25-Hydroxy,Total (for eval of Vitamin D levels)            Assessment & Plan  Obesity  Lost 55 pounds since November, losing 6 pounds/month. On 7.5 mg GLP-1 receptor agonist for weight management and portion control. Prefers current dose to avoid GI side effects.  - Continue 7.5 mg GLP-1 receptor agonist.  - Monitor weight and adjust medication as needed.    Prediabetes  Previously diagnosed with prediabetes, close to diabetes threshold. Plans to assess impact of weight loss on blood glucose.  - Order A1c test.    Hyperlipidemia  On Crestor. Plans to evaluate weight loss effect  on cholesterol. Goal: LDL <55 due to arterial plaque. Discussed cholesterol management to prevent plaque buildup and clarified statins' role in vascular dementia risk.  - Order lipid panel.  - Continue Crestor.  - Adjust Crestor dosage based on lipid panel results.    Hypocalcemia  Low calcium levels, on calcitriol. Last calcium level was 8.4.  - Order calcium level test.  - Continue calcitriol four times a day.    Vitamin D deficiency  On weekly vitamin D supplement, likely needed year-round.  - Refill vitamin D supplement.    Hypothyroidism  Under endocrinologist care, on levothyroxine.  - Coordinate with endocrinologist for levothyroxine management.    Depression  On Prozac and Wellbutrin. Uses Wellbutrin as needed. Reports symptom improvement.  - Continue Prozac.  - Use Wellbutrin as needed.    Asthma  Uses Dulera. Asthma well-controlled, flares when sick.  - Continue Dulera.    Hearing loss  Reports hearing difficulties, possibly due to eczema and eardrum scarring. Suspects hearing loss due to stiff eardrums.  - Refer to ENT for hearing evaluation.    General Health Maintenance  Due for mammogram. Completed colonoscopy last year with five-year follow-up recommended.  - Order mammogram.  - Schedule mammogram.  - Ensure routine screenings are up to date.    Follow-up  Will follow up with endocrinologist next week. Labs to be done prior to appointment.  - Follow up with endocrinologist next week.  - Complete labs prior to endocrinologist appointment.  - Monitor lab results and coordinate care.      Level 4    Follow-Up Recommendations: 3mo    Please excuse any typos or grammatical errors, part of this note was constructed with Dragon dictation software.    This medical note was created with the assistance of artificial intelligence (AI) for documentation purposes. The content has been reviewed and confirmed by the healthcare provider for accuracy and completeness. Patient consented to the use of audio recording  and use of AI during their visit.         Callie Lemus DO, MSEd  [facility]   Office: [phone]  6/30/2025 9:04 PM

## 2025-06-30 ENCOUNTER — APPOINTMENT (OUTPATIENT)
Dept: PHARMACY | Facility: HOSPITAL | Age: 60
End: 2025-06-30
Payer: COMMERCIAL

## 2025-06-30 DIAGNOSIS — E66.812 CLASS 2 SEVERE OBESITY WITH SERIOUS COMORBIDITY AND BODY MASS INDEX (BMI) OF 39.0 TO 39.9 IN ADULT, UNSPECIFIED OBESITY TYPE: ICD-10-CM

## 2025-06-30 DIAGNOSIS — E66.01 CLASS 2 SEVERE OBESITY WITH SERIOUS COMORBIDITY AND BODY MASS INDEX (BMI) OF 39.0 TO 39.9 IN ADULT, UNSPECIFIED OBESITY TYPE: ICD-10-CM

## 2025-06-30 NOTE — ASSESSMENT & PLAN NOTE
Patient was referred by her PCP to the clinical pharmacy team for weight loss management.  Patient is currently on Zepbound 7.5 mg has lost a total of 54 lbs. Patient reported minimal nausea and diarrhea the day after injection, but states it is bearable. Discussed with patient continuing Zepbound dose as patient is consistently losing weight.  Patient was agreeable.     PLAN  CONTINUE Zepbound 7.5 mg - Inject 7.5 mg under the skin once weekly.  Prescription sent [facility].

## 2025-06-30 NOTE — PROGRESS NOTES
Clinical Pharmacy Appointment    Patient ID: Arlette Cedeño is a 59 y.o. female who presents for weight loss management.     Pt is here for Follow Up appointment.     Referring Provider: Callie Lemus DO  PCP: Callie Lemus DO   Last visit with PCP: 6/10/2025  Next visit with PCP: 12/8/2025      Subjective     WEIGHT LOSS  BMI Readings from Last 3 Encounters:   06/10/25 31.00 kg/m²   04/09/25 34.06 kg/m²   11/22/24 39.74 kg/m²      Starting weight: 231 lbs.   Current weight: 177 lbs  Goal: 140 lbs    Current  Pharmacotherapy:   Zepbound 7.5 mg   Reports nausea and diarrhea - bareable     Non-Pharmacological Therapy  Weight loss techniques attempted:  Self directed dieting    Relevant PMH:  PMH of Pancreatitis? none  PMH of Retinopathy? none  PMH of MTC? none    Insurance coverage of weight-loss medications? No, PA was approved on 12/19/24.   Eligible for [employer] cards/programs? Yes  Eligible for  PAP? No, reports income >400% FPL    Medication Estimated Cost Expected weight loss Patient Risks/Cautions Notes   Wegovy (semaglutide) ~$650/month w/ savings card 10-20% None      Zepbound (tirzepatide) $650/month   w/ savings card 10-20%     Qsymia (phentermine-topiramate) $98/month via [facility] 5-10% None Controlled substance   Contrave (bupropion-naltrexone) $99/month   via [employer] 5-10% None    Adipex (phentermine) ~$15/month 3-5% None Controlled substance,  Short-term use only   Navarro (OTC Orlistat) ~$60/month 3-5%  Adverse effects likely outweigh benefit.     Drug Interactions  No relevant drug interactions were noted.    Medication System Management  Patient's preferred pharmacy: [facility] - SANDIE Garcia  Adherence/Organization: n/a  Affordability/Accessibility: n/a      Objective   Allergies   Allergen Reactions    Cat Hair Standardized Allergenic Extract Shortness of breath    Amoxicillin Unknown    Doxycycline Photosensitivity    Garlic Other     Upset stomach    Metformin GI Upset  "    Social History     Social History Narrative    Not on file      Medication Review  Current Outpatient Medications   Medication Instructions    albuterol 90 mcg/actuation inhaler 2 puffs, inhalation, Every 4 hours PRN    benzoyl peroxide 5 % external wash 1 Application, Every Day    buPROPion XL (WELLBUTRIN XL) 150 mg, oral, Daily PRN    calcitriol (ROCALTROL) 0.25 mcg, oral, 4 times daily    ergocalciferol (VITAMIN D-2) 1.25 mg, oral, Once Weekly    FLUoxetine (PROZAC) 20 mg, oral, Daily    levothyroxine (SYNTHROID, LEVOXYL) 125 mcg, oral, Daily    mometasone-formoterol (Dulera) 200-5 mcg/actuation inhaler 2 puffs, inhalation, 2 times daily    montelukast (SINGULAIR) 10 mg, oral, Daily    rosuvastatin (CRESTOR) 10 mg, oral, Daily    tirzepatide (weight loss) (ZEPBOUND) 7.5 mg, subcutaneous, Every 7 days    triamcinolone (Kenalog) 0.1 % cream 1 Application, Topical, 2 times daily      Vitals  BP Readings from Last 2 Encounters:   06/10/25 98/67   04/09/25 114/80     BMI Readings from Last 1 Encounters:   06/10/25 31.00 kg/m²      Labs  A1C  Lab Results   Component Value Date    HGBA1C 6.1 (H) 07/09/2024    HGBA1C 6.0 04/23/2024    HGBA1C 6.0 (H) 12/11/2023     BMP  Lab Results   Component Value Date    CALCIUM 7.9 (L) 07/09/2024     07/09/2024    K 3.8 07/09/2024    CO2 31 07/09/2024     07/09/2024    BUN 11 07/09/2024    CREATININE 0.77 07/09/2024    EGFR 90 07/09/2024     LFTs  Lab Results   Component Value Date    ALT 33 07/09/2024    AST 22 07/09/2024    ALKPHOS 42 07/09/2024    BILITOT 0.5 07/09/2024     FLP  Lab Results   Component Value Date    TRIG 164 (H) 07/09/2024    CHOL 240 (H) 07/09/2024    LDLF 140 (H) 04/09/2021    LDLCALC 163 (H) 07/09/2024    HDL 44.1 07/09/2024     Urine Microalbumin  No results found for: \"MICROALBCREA\"  Weight Management  Wt Readings from Last 3 Encounters:   06/10/25 81.9 kg (180 lb 9.6 oz)   04/09/25 90 kg (198 lb 6.4 oz)   11/22/24 105 kg (231 lb 8 oz)    "   There is no height or weight on file to calculate BMI.     Assessment/Plan   Problem List Items Addressed This Visit       Class 2 severe obesity with serious comorbidity and body mass index (BMI) of 39.0 to 39.9 in adult    Patient was referred by her PCP to the clinical pharmacy team for weight loss management.  Patient is currently on Zepbound 7.5 mg has lost a total of 54 lbs. Patient reported minimal nausea and diarrhea the day after injection, but states it is bearable. Discussed with patient continuing Zepbound dose as patient is consistently losing weight.  Patient was agreeable.     PLAN  CONTINUE Zepbound 7.5 mg - Inject 7.5 mg under the skin once weekly.  Prescription sent [facility].            Relevant Orders    Referral to Clinical Pharmacy     Clinical Pharmacist follow-up: 4 weeks, Telehealth visit    Continue all meds under the continuation of care with the referring provider and clinical pharmacy team.    Thank you,  Kaitlynn Reese, PharmD  Clinical Pharmacist   [phone]    Verbal consent to manage patient's drug therapy was obtained from the patient. They were informed they may decline to participate or withdraw from participation in pharmacy services at any time.

## 2025-07-18 DIAGNOSIS — E66.812 CLASS 2 SEVERE OBESITY WITH SERIOUS COMORBIDITY AND BODY MASS INDEX (BMI) OF 39.0 TO 39.9 IN ADULT, UNSPECIFIED OBESITY TYPE: ICD-10-CM

## 2025-07-18 DIAGNOSIS — E66.01 CLASS 2 SEVERE OBESITY WITH SERIOUS COMORBIDITY AND BODY MASS INDEX (BMI) OF 39.0 TO 39.9 IN ADULT, UNSPECIFIED OBESITY TYPE: ICD-10-CM

## 2025-07-28 NOTE — ASSESSMENT & PLAN NOTE
Patient is a follow up for weight loss management. Patient is currently taking Zepound 7.5 mg and reports some nausea  after injections, but is tolerable. Patient dad just passed away so she has been having a difficult time coping, which caused her to not lose any weight this month. Patient did have issue with pharmacy not having Zepbound in stock, but was able to eventually get it. Discussed with patient to continue current and if she has any issues with pharmacy filling the Zepound to reach out clinical pharmacist for assistance.    PLAN  Continue Zepbound 7.5 mg - Inject 7.5 mg under the skin once weekly.  Future considerations:  Patient will be getting labs done in the next coming weeks. Due to  patient having nausea and vomiting the dose of Zepound was kept at 7.5 mg if she is able to improve nausea than we can increase to the 10 mg. Discussed with patient if nausea persists we may have to decrease the dose back to the 5 mg.  
no palpitations/no peripheral edema/no syncope

## 2025-07-28 NOTE — PROGRESS NOTES
Clinical Pharmacy Appointment    Patient ID: Arlette Cedeño is a 59 y.o. female who presents for weight loss management.     Pt is here for Follow Up appointment.     Referring Provider: Callie Lemus DO  PCP: Callie Lemus DO   Last visit with PCP: 6/10/2025  Next visit with PCP: 12/8/2025      Subjective     WEIGHT LOSS  BMI Readings from Last 3 Encounters:   06/10/25 31.00 kg/m²   04/09/25 34.06 kg/m²   11/22/24 39.74 kg/m²      Starting weight: 231 lbs.   Current weight: 177 lbs  Goal: 140 lbs    Current  Pharmacotherapy:   Zepbound 7.5 mg   Reports nausea and diarrhea - bareable     Non-Pharmacological Therapy  Weight loss techniques attempted:  Self directed dieting    Relevant PMH:  PMH of Pancreatitis? none  PMH of Retinopathy? none  PMH of MTC? none    Insurance coverage of weight-loss medications? No, PA was approved on 12/19/24.   Eligible for [employer] cards/programs? Yes  Eligible for  PAP? No, reports income >400% FPL    Medication Estimated Cost Expected weight loss Patient Risks/Cautions Notes   Wegovy (semaglutide) ~$650/month w/ savings card 10-20% None      Zepbound (tirzepatide) $650/month   w/ savings card 10-20%     Qsymia (phentermine-topiramate) $98/month via [employer]symia [employer] 5-10% None Controlled substance   Contrave (bupropion-naltrexone) $99/month   via [employer] 5-10% None    Adipex (phentermine) ~$15/month 3-5% None Controlled substance,  Short-term use only   Navarro (OTC Orlistat) ~$60/month 3-5%  Adverse effects likely outweigh benefit.     Drug Interactions  No relevant drug interactions were noted.    Medication System Management  Patient's preferred pharmacy: [facility] [place]- [facility]  Adherence/Organization: n/a  Affordability/Accessibility: n/a      Objective   Allergies   Allergen Reactions    Cat Hair Standardized Allergenic Extract Shortness of breath    Amoxicillin Unknown    Doxycycline Photosensitivity    Garlic Other     Upset stomach    Metformin GI Upset  "    Social History     Social History Narrative    Not on file      Medication Review  Current Outpatient Medications   Medication Instructions    albuterol 90 mcg/actuation inhaler 2 puffs, inhalation, Every 4 hours PRN    benzoyl peroxide 5 % external wash 1 Application, Every Day    buPROPion XL (WELLBUTRIN XL) 150 mg, oral, Daily PRN    calcitriol (ROCALTROL) 0.25 mcg, oral, 4 times daily    ergocalciferol (VITAMIN D-2) 1.25 mg, oral, Once Weekly    FLUoxetine (PROZAC) 20 mg, oral, Daily    levothyroxine (SYNTHROID, LEVOXYL) 125 mcg, oral, Daily    mometasone-formoterol (Dulera) 200-5 mcg/actuation inhaler 2 puffs, inhalation, 2 times daily    montelukast (SINGULAIR) 10 mg, oral, Daily    rosuvastatin (CRESTOR) 10 mg, oral, Daily    tirzepatide (weight loss) (ZEPBOUND) 7.5 mg, subcutaneous, Every 7 days    triamcinolone (Kenalog) 0.1 % cream 1 Application, Topical, 2 times daily      Vitals  BP Readings from Last 2 Encounters:   06/10/25 98/67   04/09/25 114/80     BMI Readings from Last 1 Encounters:   06/10/25 31.00 kg/m²      Labs  A1C  Lab Results   Component Value Date    HGBA1C 6.1 (H) 07/09/2024    HGBA1C 6.0 04/23/2024    HGBA1C 6.0 (H) 12/11/2023     BMP  Lab Results   Component Value Date    CALCIUM 7.9 (L) 07/09/2024     07/09/2024    K 3.8 07/09/2024    CO2 31 07/09/2024     07/09/2024    BUN 11 07/09/2024    CREATININE 0.77 07/09/2024    EGFR 90 07/09/2024     LFTs  Lab Results   Component Value Date    ALT 33 07/09/2024    AST 22 07/09/2024    ALKPHOS 42 07/09/2024    BILITOT 0.5 07/09/2024     FLP  Lab Results   Component Value Date    TRIG 164 (H) 07/09/2024    CHOL 240 (H) 07/09/2024    LDLF 140 (H) 04/09/2021    LDLCALC 163 (H) 07/09/2024    HDL 44.1 07/09/2024     Urine Microalbumin  No results found for: \"MICROALBCREA\"  Weight Management  Wt Readings from Last 3 Encounters:   06/10/25 81.9 kg (180 lb 9.6 oz)   04/09/25 90 kg (198 lb 6.4 oz)   11/22/24 105 kg (231 lb 8 oz)    "   There is no height or weight on file to calculate BMI.     Assessment/Plan   Problem List Items Addressed This Visit       Class 2 severe obesity with serious comorbidity and body mass index (BMI) of 39.0 to 39.9 in adult - Primary    Patient is a follow up for weight loss management. Patient is currently taking Zepound 7.5 mg and reports some nausea  after injections, but is tolerable. Patient dad just passed away so she has been having a difficult time coping, which caused her to not lose any weight this month. Patient did have issue with pharmacy not having Zepbound in stock, but was able to eventually get it. Discussed with patient to continue current and if she has any issues with pharmacy filling the Zepound to reach out clinical pharmacist for assistance.    PLAN  Continue Zepbound 7.5 mg - Inject 7.5 mg under the skin once weekly.  Future considerations:  Patient will be getting labs done in the next coming weeks. Due to  patient having nausea and vomiting the dose of Zepound was kept at 7.5 mg if she is able to improve nausea than we can increase to the 10 mg. Discussed with patient if nausea persists we may have to decrease the dose back to the 5 mg.         Relevant Orders    Referral to Clinical Pharmacy       Clinical Pharmacist follow-up: 4 weeks, Telehealth visit    Continue all meds under the continuation of care with the referring provider and clinical pharmacy team.    Thank you,    Theodora Pillai, PharmD  PGY-1 Pharmacy Resident  [phone]     Verbal consent to manage patient's drug therapy was obtained from the patient. They were informed they may decline to participate or withdraw from participation in pharmacy services at any time.

## 2025-07-29 ENCOUNTER — APPOINTMENT (OUTPATIENT)
Dept: PHARMACY | Facility: HOSPITAL | Age: 60
End: 2025-07-29
Payer: COMMERCIAL

## 2025-07-29 DIAGNOSIS — E66.812 CLASS 2 SEVERE OBESITY WITH SERIOUS COMORBIDITY AND BODY MASS INDEX (BMI) OF 39.0 TO 39.9 IN ADULT, UNSPECIFIED OBESITY TYPE: Primary | ICD-10-CM

## 2025-07-29 DIAGNOSIS — E66.01 CLASS 2 SEVERE OBESITY WITH SERIOUS COMORBIDITY AND BODY MASS INDEX (BMI) OF 39.0 TO 39.9 IN ADULT, UNSPECIFIED OBESITY TYPE: Primary | ICD-10-CM

## 2025-08-04 DIAGNOSIS — E66.812 CLASS 2 SEVERE OBESITY WITH SERIOUS COMORBIDITY AND BODY MASS INDEX (BMI) OF 39.0 TO 39.9 IN ADULT, UNSPECIFIED OBESITY TYPE: ICD-10-CM

## 2025-08-04 DIAGNOSIS — E66.01 CLASS 2 SEVERE OBESITY WITH SERIOUS COMORBIDITY AND BODY MASS INDEX (BMI) OF 39.0 TO 39.9 IN ADULT, UNSPECIFIED OBESITY TYPE: ICD-10-CM

## 2025-08-04 RX ORDER — TIRZEPATIDE 7.5 MG/.5ML
7.5 INJECTION, SOLUTION SUBCUTANEOUS
Qty: 6 ML | Refills: 1 | Status: SHIPPED | OUTPATIENT
Start: 2025-08-04

## 2025-08-05 NOTE — TELEPHONE ENCOUNTER
BRIEF NOTE    Subjective/Objective:  Pharmacy refill request.     Assessment/Plan:  1. Class 2 severe obesity with serious comorbidity and body mass index (BMI) of 39.0 to 39.9 in adult, unspecified obesity type  - tirzepatide, weight loss, (Zepbound) 7.5 mg/0.5 mL injection; Inject 7.5 mg under the skin every 7 days.  Dispense: 6 mL; Refill: 1    No problem-specific Assessment & Plan notes found for this encounter.        Callie Lemus DO, [place]  [facility]  Office: [phone]  8/4/2025 10:51 PM

## 2025-08-25 ENCOUNTER — APPOINTMENT (OUTPATIENT)
Dept: PHARMACY | Facility: HOSPITAL | Age: 60
End: 2025-08-25
Payer: COMMERCIAL

## 2025-08-25 DIAGNOSIS — E66.812 CLASS 2 SEVERE OBESITY WITH SERIOUS COMORBIDITY AND BODY MASS INDEX (BMI) OF 39.0 TO 39.9 IN ADULT, UNSPECIFIED OBESITY TYPE: Primary | ICD-10-CM

## 2025-08-25 DIAGNOSIS — E66.01 CLASS 2 SEVERE OBESITY WITH SERIOUS COMORBIDITY AND BODY MASS INDEX (BMI) OF 39.0 TO 39.9 IN ADULT, UNSPECIFIED OBESITY TYPE: Primary | ICD-10-CM

## 2025-09-22 ENCOUNTER — APPOINTMENT (OUTPATIENT)
Dept: PHARMACY | Facility: HOSPITAL | Age: 60
End: 2025-09-22
Payer: COMMERCIAL

## 2025-12-08 ENCOUNTER — APPOINTMENT (OUTPATIENT)
Dept: PRIMARY CARE | Facility: CLINIC | Age: 60
End: 2025-12-08
Payer: COMMERCIAL